# Patient Record
Sex: MALE | Race: BLACK OR AFRICAN AMERICAN | NOT HISPANIC OR LATINO | Employment: FULL TIME | URBAN - METROPOLITAN AREA
[De-identification: names, ages, dates, MRNs, and addresses within clinical notes are randomized per-mention and may not be internally consistent; named-entity substitution may affect disease eponyms.]

---

## 2018-08-27 ENCOUNTER — HOSPITAL ENCOUNTER (EMERGENCY)
Facility: HOSPITAL | Age: 43
Discharge: HOME | End: 2018-08-27
Attending: EMERGENCY MEDICINE
Payer: MEDICAID

## 2018-08-27 VITALS
HEIGHT: 73 IN | OXYGEN SATURATION: 98 % | SYSTOLIC BLOOD PRESSURE: 133 MMHG | TEMPERATURE: 99 F | BODY MASS INDEX: 25.58 KG/M2 | RESPIRATION RATE: 18 BRPM | WEIGHT: 193 LBS | HEART RATE: 78 BPM | DIASTOLIC BLOOD PRESSURE: 72 MMHG

## 2018-08-27 DIAGNOSIS — L30.1 DYSHIDROTIC ECZEMA: Primary | ICD-10-CM

## 2018-08-27 PROCEDURE — 25000003 PHARM REV CODE 250: Performed by: NURSE PRACTITIONER

## 2018-08-27 PROCEDURE — 99283 EMERGENCY DEPT VISIT LOW MDM: CPT

## 2018-08-27 RX ORDER — TRIAMCINOLONE ACETONIDE 1 MG/G
CREAM TOPICAL
Status: COMPLETED | OUTPATIENT
Start: 2018-08-27 | End: 2018-08-27

## 2018-08-27 RX ORDER — TRIAMCINOLONE ACETONIDE 1 MG/G
CREAM TOPICAL 2 TIMES DAILY
Qty: 45 G | Refills: 0 | Status: SHIPPED | OUTPATIENT
Start: 2018-08-27 | End: 2019-04-18 | Stop reason: SDUPTHER

## 2018-08-27 RX ADMIN — TRIAMCINOLONE ACETONIDE: 1 CREAM TOPICAL at 12:08

## 2018-08-27 NOTE — ED TRIAGE NOTES
43 y.o male presents to the ED w/ family. He reports a black spot to his left middle finger x1 month. He reports that he tried anti-fungal cream from [employer] w/ no relief. He also is concerned about a white bump on the tip of his tounge. He is aaox4, ND noted.

## 2018-08-27 NOTE — DISCHARGE INSTRUCTIONS
Apply Eucerin lotion to the finger at least once daily.  You can purchase over-the-counter at any drug store are pharmacy.    Please watch for signs of infection including: increased\spreading redness, swelling, pus-like discharge, or a fever greater than 100.4F. If you experience any of these, please contact your Primary Care Doctor or Return to the Emergency Department for a wound check.     Please follow up with your Primary Care Doctor for skin recheck. Please return to the ER for any new or worsening symptoms.

## 2019-03-25 ENCOUNTER — HOSPITAL ENCOUNTER (EMERGENCY)
Facility: HOSPITAL | Age: 44
Discharge: HOME | End: 2019-03-25
Attending: EMERGENCY MEDICINE
Payer: MEDICAID

## 2019-03-25 VITALS
TEMPERATURE: 98 F | HEIGHT: 73 IN | OXYGEN SATURATION: 97 % | SYSTOLIC BLOOD PRESSURE: 127 MMHG | RESPIRATION RATE: 20 BRPM | HEART RATE: 70 BPM | DIASTOLIC BLOOD PRESSURE: 81 MMHG | BODY MASS INDEX: 25.18 KG/M2 | WEIGHT: 190 LBS

## 2019-03-25 DIAGNOSIS — H10.9 CONJUNCTIVITIS OF BOTH EYES, UNSPECIFIED CONJUNCTIVITIS TYPE: Primary | ICD-10-CM

## 2019-03-25 PROCEDURE — 99284 EMERGENCY DEPT VISIT MOD MDM: CPT

## 2019-03-25 RX ORDER — ERYTHROMYCIN 5 MG/G
OINTMENT OPHTHALMIC
Qty: 1 TUBE | Refills: 0 | Status: SHIPPED | OUTPATIENT
Start: 2019-03-25 | End: 2023-01-13

## 2019-03-25 RX ORDER — CETIRIZINE HYDROCHLORIDE 10 MG/1
10 TABLET ORAL DAILY
Qty: 30 TABLET | Refills: 0 | Status: SHIPPED | OUTPATIENT
Start: 2019-03-25 | End: 2021-02-10 | Stop reason: SDUPTHER

## 2019-03-25 NOTE — ED TRIAGE NOTES
Pt reports to ED via personal transportation alone with c/o crusty eye drainage when waking up in the morning & eye itching/irritation throughout the day ongoing for 1 week along with dry, itchy rash to finger of left hand and left anticubital area ongoing for 1 year; pt reports being seen for the finger rash in the past, was prescribed cream, and used all the cream with no relief; pt AAOx4;

## 2019-03-25 NOTE — DISCHARGE INSTRUCTIONS
Take medications as prescribed.  Follow-up with your regular doctor or the one listed on your discharge paperwork for further treatment.  Return to the emergency department for any new or worsening symptoms or as needed for any additional concerns.    Thank you for coming to our Emergency Department today. It is important to remember that some problems are difficult to diagnose and may not be found during your first visit. Be sure to follow up with your primary care doctor.  If you do not have one, you may contact the one listed on your discharge paperwork or you may also call the [facility] Appointment Desk at [phone] to schedule an appointment with one.     Return to the ER with any questions/concerns, new/concerning symptoms, worsening or failure to improve. Do not drive or make any important decisions for 24 hours if you have received any pain medications, sedatives or mood altering drugs during your ER visit.

## 2019-03-25 NOTE — ED PROVIDER NOTES
Encounter Date: 3/25/2019       History     Chief Complaint   Patient presents with    Eye Drainage     started over a week ago, pt reports when he wakes up he has hardened eye drainage around his eyelids     43-year-old male presenting for evaluation of bilateral eye redness and drainage that began 1-1/2 to 2 weeks ago.  Reports associated itchiness and scratchiness that feels like sandpaper in both eyes.  Symptoms began both eyes at the same time.  He reports awaking in the mornings with his eyes crusted shut.  Denies trauma, fevers, pain with extraocular motions, blurred vision, changes in vision, rhinorrhea, sinus congestion, headache, photophobia, or any additional symptoms. Patient does not wear contacts or glasses.  He has not attempted any treatments for symptoms.        Review of patient's allergies indicates:  No Known Allergies  History reviewed. No pertinent past medical history.  History reviewed. No pertinent surgical history.  History reviewed. No pertinent family history.  Social History     Tobacco Use    Smoking status: Current Some Day Smoker     Packs/day: 0.50     Types: Cigars    Smokeless tobacco: Never Used    Tobacco comment: black & mild   Substance Use Topics    Alcohol use: Yes     Comment: weekends    Drug use: No     Review of Systems   Constitutional: Negative for chills and fever.   HENT: Negative for congestion, postnasal drip, rhinorrhea, sinus pressure and sore throat.    Eyes: Positive for discharge, redness and itching. Negative for pain.   Respiratory: Negative for apnea, cough, choking, chest tightness, shortness of breath, wheezing and stridor.    Cardiovascular: Negative for chest pain, palpitations and leg swelling.   Gastrointestinal: Negative for abdominal pain, diarrhea, nausea and vomiting.   Genitourinary: Negative for dysuria, flank pain, penile pain and urgency.   Musculoskeletal: Negative for arthralgias, back pain, gait problem, joint swelling, myalgias, neck  pain and neck stiffness.   Skin: Negative for color change, pallor, rash and wound.   Neurological: Negative for dizziness, tremors, seizures, syncope and light-headedness.   Psychiatric/Behavioral: Negative for confusion. The patient is not nervous/anxious.        Physical Exam     Initial Vitals [03/25/19 0629]   BP Pulse Resp Temp SpO2   129/82 73 17 99 °F (37.2 °C) 97 %      MAP       --         Physical Exam    Vitals reviewed.  Constitutional: Vital signs are normal. He appears well-developed and well-nourished. He is not diaphoretic. He is active and cooperative.  Non-toxic appearance. He does not have a sickly appearance. He does not appear ill. No distress.   Afebrile, well appearing, in no distress   HENT:   Head: Normocephalic and atraumatic.   Right Ear: Hearing, tympanic membrane, external ear and ear canal normal.   Left Ear: Hearing, tympanic membrane, external ear and ear canal normal.   Nose: Nose normal.   Eyes: EOM and lids are normal. Pupils are equal, round, and reactive to light. Right eye exhibits no chemosis and no discharge. Left eye exhibits no chemosis and no discharge. Right conjunctiva is injected. Left conjunctiva is injected.   Slit lamp exam:       The right eye shows no corneal ulcer, no hyphema and no hypopyon.        The left eye shows no corneal ulcer, no hyphema and no hypopyon.   Bilateral conjunctival injection.  No drainage or exudate noted. No corneal ulcer.  No abrasion, hyphema, hypopyon, or chemosis.  No pain with extraocular motions.  No periorbital erythema or edema. PERRLA bilaterally.   Neck: Normal range of motion and phonation normal. Neck supple. No tracheal deviation present.   Cardiovascular: Normal rate.   Pulmonary/Chest: No stridor. No respiratory distress.   Abdominal: Soft. He exhibits no distension. There is no tenderness.   Musculoskeletal: Normal range of motion. He exhibits no tenderness.   Neurological: He is alert and oriented to person, place, and  time. He has normal strength. No cranial nerve deficit.   Skin: Skin is warm and dry.   Psychiatric: He has a normal mood and affect. His behavior is normal. Judgment and thought content normal.         ED Course   Procedures  Labs Reviewed - No data to display       Imaging Results    None          Medical Decision Making:   History:   Old Medical Records: I decided to obtain old medical records.  Differential Diagnosis:   Conjunctivitis, periorbital cellulitis, orbital cellulitis, corneal ulceration, iritis,  ED Management:  36 y.o. male with redness, subjective discharge, and crusting in both eyes that began 1 and half to 2 weeks ago. Exudative crusting present upon waking this morning.  No other symptoms. Denies pain but reports sandpaper-like scratchy feeling.  No significant prior ophthalmological history. No blurred vision, change in visual acuity, no photophobia, no severe eye pain. No pain with extraocular motion. Patient does not wear contacts.  Patient is afebrile, pleasant, appears well, vitals signs are normal. Bilateral eyes with findings of typical conjunctivitis noted including conjunctival erythema. PERRLA bilaterally, no foreign body noted. No corneal ulceration. The corneas are clear and fundi normal. Visual acuity normal. No periorbital erythema or edema. No findings concering for preseptal cellulitis. Findings clinically consistent with acute infective conjunctivitis. Hygiene discussed.  Prescribed erythromycin ointment and Zyrtec.    Advised patient to follow up with his PCP for re-evaluation and further management.  ED return precautions given. All questions regarding diagnosis and plan were answered to the patient's fullest possible satisfaction. Patient expressed understanding of diagnosis, discharge instructions, and return precautions.                          Clinical Impression:       ICD-10-CM ICD-9-CM   1. Conjunctivitis of both eyes, unspecified conjunctivitis type H10.9 372.30          Disposition:   Disposition: Discharged  Condition: Stable                        Carlitos Donnelly NP  03/25/19 0883

## 2019-04-18 ENCOUNTER — HOSPITAL ENCOUNTER (EMERGENCY)
Facility: HOSPITAL | Age: 44
Discharge: HOME | End: 2019-04-18
Attending: EMERGENCY MEDICINE
Payer: MEDICAID

## 2019-04-18 VITALS
DIASTOLIC BLOOD PRESSURE: 70 MMHG | TEMPERATURE: 99 F | RESPIRATION RATE: 18 BRPM | WEIGHT: 187 LBS | OXYGEN SATURATION: 98 % | SYSTOLIC BLOOD PRESSURE: 130 MMHG | HEART RATE: 82 BPM | HEIGHT: 73 IN | BODY MASS INDEX: 24.78 KG/M2

## 2019-04-18 DIAGNOSIS — R21 SKIN RASH: Primary | ICD-10-CM

## 2019-04-18 PROCEDURE — 99283 EMERGENCY DEPT VISIT LOW MDM: CPT

## 2019-04-18 RX ORDER — TRIAMCINOLONE ACETONIDE 1 MG/G
CREAM TOPICAL 2 TIMES DAILY
Qty: 15 G | Refills: 0 | Status: SHIPPED | OUTPATIENT
Start: 2019-04-18 | End: 2019-05-02

## 2019-04-18 NOTE — ED TRIAGE NOTES
Patient reports rash and blister to left middle finger.  Patient denies pain. Patient reports that symptoms have been intermittent for a year.

## 2019-04-18 NOTE — DISCHARGE INSTRUCTIONS
Please return to the Emergency Department for any new or worsening symptoms including: worsening or changes in the finger, fever, chest pain, shortness of breath, loss of consciousness, dizziness, weakness, or any other concerns.     Please follow up with your Primary Care Provider or Demrmatology within in the week. If you do not have one, you may contact the one listed on your discharge paperwork or you may also call the [facility] Appointment Desk at [phone] to schedule an appointment with one.      Please take all medication as prescribed.     Use moisturizer between doses of the steroid cream.

## 2019-04-18 NOTE — ED PROVIDER NOTES
"Encounter Date: 4/18/2019    SCRIBE #1 NOTE: I, Jh Kunz , am scribing for, and in the presence of,  HOANG Miramontes. I have scribed the following portions of the note - Other sections scribed: HPI, ROS.       History     Chief Complaint   Patient presents with    Blister     Blisters to left middle finger.  "It bleeds sometimes and has pus."     CC: Blister    HPI: 45 y/o M who has no past medical history on file presents to the ED with gradual onset of a rash/blister to the L middle finger. Pt was seen at the ED 8/27/18 for same symptoms and was prescribed Kenalog cream which provides temporary relief. Pt states upon applying the cream his blister will dry up but will come back within a couple of days. He reports intermittent episodes of bleeding and pus drainage. Pt denies making any follow up appointments with Dermatology. Pt works as a . Pt has no other complaints at this time. He is also requesting a work note.       The history is provided by the patient. No  was used.     Review of patient's allergies indicates:  No Known Allergies  History reviewed. No pertinent past medical history.  History reviewed. No pertinent surgical history.  History reviewed. No pertinent family history.  Social History     Tobacco Use    Smoking status: Current Some Day Smoker     Packs/day: 0.50     Types: Cigars    Smokeless tobacco: Never Used    Tobacco comment: black & mild   Substance Use Topics    Alcohol use: Yes     Comment: weekends    Drug use: No     Review of Systems   Constitutional: Negative for appetite change and fever.   HENT: Negative for rhinorrhea and sore throat.    Eyes: Negative for visual disturbance.   Respiratory: Negative for cough and shortness of breath.    Cardiovascular: Negative for chest pain.   Gastrointestinal: Negative for abdominal pain.   Genitourinary: Negative for dysuria.   Musculoskeletal: Negative for arthralgias and gait problem.   Skin: Positive for " rash (Left Middle Finger ).   Neurological: Negative for syncope.       Physical Exam     Initial Vitals [04/18/19 0739]   BP Pulse Resp Temp SpO2   130/70 82 18 99.3 °F (37.4 °C) 98 %      MAP       --         Physical Exam    Nursing note and vitals reviewed.  Constitutional: He appears well-developed and well-nourished. He is not diaphoretic. He is cooperative.  Non-toxic appearance. No distress.   HENT:   Head: Normocephalic and atraumatic.   Right Ear: External ear normal.   Left Ear: External ear normal.   Mouth/Throat: Oropharynx is clear and moist.   Eyes: Conjunctivae and EOM are normal.   Neck: Normal range of motion.   Cardiovascular: Normal rate and regular rhythm.   Pulmonary/Chest: No respiratory distress.   Neurological: He is alert and oriented to person, place, and time. GCS eye subscore is 4. GCS verbal subscore is 5. GCS motor subscore is 6.   Skin: Skin is warm and dry. Capillary refill takes less than 2 seconds.   Area of dry skin on the left middle finger without blistering, erythema, drainage, or bleeding. No increased warmth or visible open wounds. No TTP over the digit. Normal flexion and extension of all joints. Callus present at the MCP joint on the palmar hand of 3rd and 4th fingers of left hand and right hand. No other wounds on palm. See image below:    Psychiatric: He has a normal mood and affect. His behavior is normal. Judgment and thought content normal.             ED Course   Procedures  Labs Reviewed - No data to display       Imaging Results    None                APC / Resident Notes:   This is an evaluation of a 44 y.o. male that presents to the Emergency Department for rash/blistering to the left middle finger.  Patient reports ongoing for several months.  Reports got a steroid cream in the ED which help symptoms but it returned after he finishes the cream.  He is currently out of the cream Physical Exam shows a non-toxic, afebrile, and well appearing male.  There is an area  of dry skin on the left middle finger from the PIP distally on the dorsal surface of the finger.  There is no erythema, increased warmth, drainage, or signs of infection.  Normal flexion extension of all joints.  He has callused lesions on the palmar hand near the MCP.  No other palmar lesions noted. No other rashes or similar lesions to the finger identified. Vital signs are reassuring. If available, previous records reviewed.     My overall impression is skin rash-?  Atopic dermatitis versus other nonspecific dermatitis. I considered, but at this time, do not suspect cellulitis, abscess, flexor tenosynovitis.  Does not appear consistent with herpetic prasanna.    D/C Meds:  Kenalog. D/C Information:  Moisturizer between Kenalog use. The diagnosis, treatment plan, instructions for follow-up and reevaluation with Dermatology as well as ED return precautions were discussed and understanding was verbalized. All questions or concerns have been addressed.  Patient requested a work note, 1 was given return to work today.  KRIS Argueta, HOANG-C       Scribe Attestation:   Scribe #1: I performed the above scribed service and the documentation accurately describes the services I performed. I attest to the accuracy of the note.    Attending Attestation:           Physician Attestation for Scribe:  Physician Attestation Statement for Scribe #1: I, HOANG Miramontes, reviewed documentation, as scribed by Jh Kunz  in my presence, and it is both accurate and complete.                    Clinical Impression:       ICD-10-CM ICD-9-CM   1. Skin rash R21 782.1         Disposition:   Disposition: Discharged  Condition: Stable                        HOANG Green  04/18/19 0814

## 2019-11-01 ENCOUNTER — HOSPITAL ENCOUNTER (EMERGENCY)
Facility: HOSPITAL | Age: 44
Discharge: HOME | End: 2019-11-01
Attending: EMERGENCY MEDICINE
Payer: COMMERCIAL

## 2019-11-01 VITALS
HEART RATE: 71 BPM | SYSTOLIC BLOOD PRESSURE: 132 MMHG | OXYGEN SATURATION: 98 % | DIASTOLIC BLOOD PRESSURE: 84 MMHG | HEIGHT: 73 IN | WEIGHT: 185 LBS | RESPIRATION RATE: 16 BRPM | TEMPERATURE: 98 F | BODY MASS INDEX: 24.52 KG/M2

## 2019-11-01 DIAGNOSIS — R10.30 LOWER ABDOMINAL PAIN: Primary | ICD-10-CM

## 2019-11-01 DIAGNOSIS — R31.9 HEMATURIA, UNSPECIFIED TYPE: ICD-10-CM

## 2019-11-01 LAB
BILIRUBIN, POC UA: NEGATIVE
BLOOD, POC UA: ABNORMAL
CLARITY, POC UA: CLEAR
COLOR, POC UA: YELLOW
GLUCOSE, POC UA: NEGATIVE
KETONES, POC UA: NEGATIVE
LEUKOCYTE EST, POC UA: NEGATIVE
NITRITE, POC UA: NEGATIVE
PH UR STRIP: 5.5 [PH]
PROTEIN, POC UA: NEGATIVE
SPECIFIC GRAVITY, POC UA: >=1.03
UROBILINOGEN, POC UA: 0.2 E.U./DL

## 2019-11-01 PROCEDURE — 87491 CHLMYD TRACH DNA AMP PROBE: CPT

## 2019-11-01 PROCEDURE — 99284 EMERGENCY DEPT VISIT MOD MDM: CPT | Mod: 25,ER

## 2019-11-01 PROCEDURE — 81003 URINALYSIS AUTO W/O SCOPE: CPT | Mod: ER

## 2019-11-01 NOTE — DISCHARGE INSTRUCTIONS
Drink plenty of fluids. Follow up with a primary care physician or urologist for further discussion of the hematuria. Return for any new or acute problems or concerns.

## 2019-11-01 NOTE — ED PROVIDER NOTES
Encounter Date: 11/1/2019    SCRIBE #1 NOTE: I, Ana Rock , am scribing for, and in the presence of,  Dr. Chirinos . I have scribed the following portions of the note - Other sections scribed: HPI, ROS, PE.       History     Chief Complaint   Patient presents with    Abdominal Pain     Pt reports lower abdominal pain almost to groin area L side, pain comes and goes, worse with palpation, reports hematuria 3 weeks ago     Mr. Nicolas presents to the ED with intermittent left lower abdominal pain that radiates into his left lower back for 2 weeks. He describes the pain as burning. He states seeing blood in his urine about 1 to 2 weeks ago. He denies penile discharge, dysuria, fever, chills, chest pain, SOB, or body aches. No known medical problems. No allergies. Denies much pain at all currently, did have some mild pain in LLQ yesterday and this am. Doesn't affect. Adls. No weight loss, no penile discharge.     The history is provided by the patient.     Review of patient's allergies indicates:  No Known Allergies  No past medical history on file.  No past surgical history on file.  No family history on file.  Social History     Tobacco Use    Smoking status: Current Some Day Smoker     Packs/day: 0.50     Types: Cigars    Smokeless tobacco: Never Used    Tobacco comment: black & mild   Substance Use Topics    Alcohol use: Yes     Comment: weekends    Drug use: No     Review of Systems   Constitutional: Negative for chills and fever.   Respiratory: Negative for shortness of breath.    Cardiovascular: Negative for chest pain.   Gastrointestinal: Positive for abdominal pain (left lower).   Genitourinary: Positive for hematuria. Negative for discharge and dysuria.   Musculoskeletal: Positive for back pain (left lower ). Negative for myalgias.   All other systems reviewed and are negative.      Physical Exam     Initial Vitals [11/01/19 1128]   BP Pulse Resp Temp SpO2   (!) 146/89 87 18 98.3 °F (36.8 °C) 98 %       MAP       --         Physical Exam    Nursing note and vitals reviewed.  Constitutional: He appears well-developed and well-nourished. He is not diaphoretic. No distress.   HENT:   Head: Normocephalic and atraumatic.   Eyes: EOM are normal.   Neck: Normal range of motion.   Cardiovascular: Normal rate, regular rhythm and normal heart sounds. Exam reveals no gallop and no friction rub.    No murmur heard.  Pulmonary/Chest: Breath sounds normal. No respiratory distress. He has no wheezes. He has no rhonchi. He has no rales.   Abdominal: Soft. He exhibits no distension. There is no tenderness. There is no rigidity, no rebound, no guarding and no CVA tenderness.   Musculoskeletal: Normal range of motion.   Neurological: He is alert and oriented to person, place, and time. No cranial nerve deficit or sensory deficit.   Skin: Skin is warm and dry. Capillary refill takes less than 2 seconds.   Psychiatric: He has a normal mood and affect. His behavior is normal.         ED Course   Procedures  Labs Reviewed   POCT URINALYSIS W/O SCOPE - Abnormal; Notable for the following components:       Result Value    Spec Grav UA >=1.030 (*)     Blood, UA Trace-lysed (*)     All other components within normal limits   C. TRACHOMATIS/N. GONORRHOEAE BY AMP DNA   POCT URINALYSIS W/O SCOPE          Imaging Results          CT Renal Stone Study Abd Pelvis WO (Final result)  Result time 11/01/19 12:26:46   Procedure changed from CT Abdomen Pelvis  Without Contrast     Final result by Ronnell Vasques MD (11/01/19 12:26:46)                 Impression:      1. No findings to suggest obstructive uropathy or acute etiology for provided history of hematuria.  2. There are multiple scattered colonic diverticula without convincing discrete surrounding inflammation to suggest diverticulitis allowing for motion artifact.  3. Additional findings above.      Electronically signed by: Ronnell Vasques MD  Date:    11/01/2019  Time:    12:26              Narrative:    EXAMINATION:  CT RENAL STONE STUDY ABD PELVIS WO    CLINICAL HISTORY:  Flank pain, stone disease suspected;LLQ pain, hematuria;    TECHNIQUE:  Low dose axial images, sagittal and coronal reformations were obtained from the lung bases to the pubic symphysis.  Contrast was not administered.    COMPARISON:  None    FINDINGS:  Images of the lower thorax are unremarkable.    There is a subcentimeter hypoattenuating lesion within the right hepatic dome, too small for characterization.  The spleen, pancreas, gallbladder and adrenal glands have a grossly unremarkable noncontrast appearance.  There is no biliary dilation or ascites.  The pancreatic duct is not dilated.  No significant abdominal lymphadenopathy.    The kidneys have a grossly unremarkable noncontrast appearance without hydronephrosis or nephrolithiasis.  The bilateral ureters are unable to be followed in their entirety to the urinary bladder, no definite calculi seen and no secondary findings to suggest obstructive uropathy.  The urinary bladder is decompressed.  The prostate is not enlarged.    Multiple scattered colonic diverticula are noted, no surrounding inflammation to suggest diverticulitis.  The terminal ileum and appendix are unremarkable.  The small bowel is grossly unremarkable.  There is atherosclerotic calcification of the aorta.  No focal organized pelvic fluid collection.    No focal osseous destructive process.  Degenerative changes are noted at L4-L5.  No significant inguinal lymphadenopathy.                                 Medical Decision Making:   Clinical Tests:   Lab Tests: Ordered and Reviewed  Radiological Study: Ordered and Reviewed  ED Management:  Hematuria noted. Non toxic and well appearing. No pain currently. gu cxs sent. Discussed ct results w pt. He will f/u a pcp and /or urology. Questions answered. Pt is stable for d/c. We discussed home care and worrisome signs that should prompt need to return to er should  they occur. There is no indication for further emergent intervention or evaluation at this time.               Scribe Attestation:   Scribe #1: I performed the above scribed service and the documentation accurately describes the services I performed. I attest to the accuracy of the note.               Clinical Impression:     1. Lower abdominal pain    2. Hematuria, unspecified type                                   Haroon Chirinos MD  11/02/19 1004

## 2019-11-03 LAB
C TRACH DNA SPEC QL NAA+PROBE: NOT DETECTED
N GONORRHOEA DNA SPEC QL NAA+PROBE: NOT DETECTED

## 2020-02-24 ENCOUNTER — HOSPITAL ENCOUNTER (EMERGENCY)
Facility: HOSPITAL | Age: 45
Discharge: HOME | End: 2020-02-24
Attending: EMERGENCY MEDICINE
Payer: COMMERCIAL

## 2020-02-24 VITALS
WEIGHT: 189 LBS | RESPIRATION RATE: 18 BRPM | HEIGHT: 73 IN | BODY MASS INDEX: 25.05 KG/M2 | SYSTOLIC BLOOD PRESSURE: 128 MMHG | DIASTOLIC BLOOD PRESSURE: 97 MMHG | TEMPERATURE: 98 F | OXYGEN SATURATION: 98 % | HEART RATE: 74 BPM

## 2020-02-24 DIAGNOSIS — M54.50 ACUTE BILATERAL LOW BACK PAIN WITHOUT SCIATICA: Primary | ICD-10-CM

## 2020-02-24 PROCEDURE — 99284 EMERGENCY DEPT VISIT MOD MDM: CPT | Mod: 25,ER

## 2020-02-24 PROCEDURE — 63600175 PHARM REV CODE 636 W HCPCS: Mod: ER | Performed by: EMERGENCY MEDICINE

## 2020-02-24 PROCEDURE — 96372 THER/PROPH/DIAG INJ SC/IM: CPT | Mod: 59,ER

## 2020-02-24 RX ORDER — DEXAMETHASONE SODIUM PHOSPHATE 4 MG/ML
8 INJECTION, SOLUTION INTRA-ARTICULAR; INTRALESIONAL; INTRAMUSCULAR; INTRAVENOUS; SOFT TISSUE
Status: COMPLETED | OUTPATIENT
Start: 2020-02-24 | End: 2020-02-24

## 2020-02-24 RX ORDER — IBUPROFEN 800 MG/1
800 TABLET ORAL EVERY 6 HOURS PRN
Qty: 20 TABLET | Refills: 0 | Status: SHIPPED | OUTPATIENT
Start: 2020-02-24 | End: 2020-02-28 | Stop reason: SDUPTHER

## 2020-02-24 RX ORDER — KETOROLAC TROMETHAMINE 30 MG/ML
60 INJECTION, SOLUTION INTRAMUSCULAR; INTRAVENOUS
Status: COMPLETED | OUTPATIENT
Start: 2020-02-24 | End: 2020-02-24

## 2020-02-24 RX ORDER — METHOCARBAMOL 750 MG/1
1500 TABLET, FILM COATED ORAL EVERY 6 HOURS
Qty: 24 TABLET | Refills: 0 | Status: SHIPPED | OUTPATIENT
Start: 2020-02-24 | End: 2020-02-28 | Stop reason: SDUPTHER

## 2020-02-24 RX ADMIN — DEXAMETHASONE SODIUM PHOSPHATE 8 MG: 4 INJECTION, SOLUTION INTRA-ARTICULAR; INTRALESIONAL; INTRAMUSCULAR; INTRAVENOUS; SOFT TISSUE at 06:02

## 2020-02-24 RX ADMIN — KETOROLAC TROMETHAMINE 60 MG: 30 INJECTION, SOLUTION INTRAMUSCULAR at 06:02

## 2020-02-24 NOTE — ED TRIAGE NOTES
Pt presents to ER with c/o lower back pain for about 2 weeks..  He states at that time he picked up on something heavy and might have strained it.  Pain radiates to the front, states it feels like a straining type of pain.

## 2020-02-24 NOTE — ED PROVIDER NOTES
Encounter Date: 2/24/2020       History     Chief Complaint   Patient presents with    Back Pain     Low back pain x 1 week unrelieved by otc meds; denies injury     44 y.o. Male with no known medical problems presents emergency department complaining of acute, nontraumatic, bilateral lower back pain that began a week ago.  Patient states he was working on a car when he noticed the symptoms at that time and states pain became worse on Saturday when he was lifting a heavy boiling pot.  He denies dysuria, hematuria, frequency, saddle anesthesia, bowel/bladder incontinence or urinary retention.    The history is provided by the patient.     Review of patient's allergies indicates:  No Known Allergies  History reviewed. No pertinent past medical history.  History reviewed. No pertinent surgical history.  History reviewed. No pertinent family history.  Social History     Tobacco Use    Smoking status: Current Some Day Smoker     Packs/day: 0.50     Types: Cigars    Smokeless tobacco: Never Used    Tobacco comment: black & mild   Substance Use Topics    Alcohol use: Yes     Comment: weekends    Drug use: No     Review of Systems   Constitutional: Negative for fever and unexpected weight change.   Genitourinary: Negative for dysuria, frequency and hematuria.   Musculoskeletal: Positive for back pain. Negative for gait problem.   All other systems reviewed and are negative.      Physical Exam     Initial Vitals [02/24/20 0522]   BP Pulse Resp Temp SpO2   (!) 138/103 77 20 97.9 °F (36.6 °C) 97 %      MAP       --         Physical Exam    Nursing note and vitals reviewed.  Constitutional: He appears well-developed and well-nourished. He is not diaphoretic. No distress.   HENT:   Head: Normocephalic and atraumatic.   Mouth/Throat: Oropharynx is clear and moist.   Eyes: Conjunctivae and EOM are normal.   Neck: Normal range of motion and phonation normal. Neck supple. No stridor present.   Cardiovascular: Regular rhythm  and intact distal pulses.   Pulmonary/Chest: No accessory muscle usage. No tachypnea. No respiratory distress.   Abdominal: Soft. He exhibits no distension. There is no tenderness. There is no rebound and no guarding.   Musculoskeletal: Normal range of motion.        Lumbar back: He exhibits tenderness and spasm. He exhibits no swelling.        Back:    Neurological: He is alert and oriented to person, place, and time.   Skin: Skin is warm and intact. No rash noted.   Psychiatric: He has a normal mood and affect.         ED Course   Procedures  Labs Reviewed - No data to display       Imaging Results    None                       Labs Reviewed       Imaging Reviewed    Imaging Results    None         Medications given in ED    Medications   dexamethasone injection 8 mg (8 mg Intramuscular Given 2/24/20 0606)   ketorolac injection 60 mg (60 mg Intramuscular Given 2/24/20 0605)       Note was created using voice recognition software. Note may have occasional typographical errors that may not have been identified and edited despite good sandra initial review prior to signing.                   Discharge Medications     Medication List with Changes/Refills   New Medications    IBUPROFEN (ADVIL,MOTRIN) 800 MG TABLET    Take 1 tablet (800 mg total) by mouth every 6 (six) hours as needed for Pain.    METHOCARBAMOL (ROBAXIN) 750 MG TAB    Take 2 tablets (1,500 mg total) by mouth every 6 (six) hours. for 3 days   Current Medications    CETIRIZINE (ZYRTEC) 10 MG TABLET    Take 1 tablet (10 mg total) by mouth once daily.    ERYTHROMYCIN (ROMYCIN) OPHTHALMIC OINTMENT    Place a 1/2 inch ribbon of ointment into the lower eyelids once every 6 hours.    TRIAMCINOLONE ACETONIDE 0.1% (KENALOG) 0.1 % CREAM    Apply topically 2 (two) times daily. Apply to finger rash. Do not apply to the face. for 14 days             Patient discharged to home in stable condition with instructions to:   1. Please take all meds as prescribed.  2.  Follow-up with your primary care doctor   3. Return precautions discussed and patient and/or family/caretaker understands to return to the emergency room for any concerns including worsening of your current symptoms, fever, chills, night sweats, worsening pain, chest pain, shortness of breath, nausea, vomiting, diarrhea, bleeding, headache, difficulty talking, visual disturbances, weakness, numbness or any other acute concerns          Clinical Impression:       ICD-10-CM ICD-9-CM   1. Acute bilateral low back pain without sciatica M54.5 724.2     338.19         Disposition:   Disposition: Discharged  Condition: Stable                     Ronni Dickson MD  02/24/20 0659

## 2021-02-10 ENCOUNTER — HOSPITAL ENCOUNTER (EMERGENCY)
Facility: HOSPITAL | Age: 46
Discharge: HOME | End: 2021-02-10
Attending: EMERGENCY MEDICINE
Payer: COMMERCIAL

## 2021-02-10 VITALS
DIASTOLIC BLOOD PRESSURE: 80 MMHG | SYSTOLIC BLOOD PRESSURE: 132 MMHG | HEART RATE: 80 BPM | WEIGHT: 189 LBS | BODY MASS INDEX: 25.05 KG/M2 | TEMPERATURE: 99 F | HEIGHT: 73 IN | OXYGEN SATURATION: 99 % | RESPIRATION RATE: 20 BRPM

## 2021-02-10 DIAGNOSIS — R09.81 NASAL CONGESTION: ICD-10-CM

## 2021-02-10 DIAGNOSIS — R05.9 COUGH: ICD-10-CM

## 2021-02-10 DIAGNOSIS — J02.9 PHARYNGITIS, UNSPECIFIED ETIOLOGY: Primary | ICD-10-CM

## 2021-02-10 LAB
CTP QC/QA: YES
POC RAPID STREP A: NEGATIVE
SARS-COV-2 RDRP RESP QL NAA+PROBE: NEGATIVE

## 2021-02-10 PROCEDURE — 99283 EMERGENCY DEPT VISIT LOW MDM: CPT | Mod: 25,ER

## 2021-02-10 PROCEDURE — U0002 COVID-19 LAB TEST NON-CDC: HCPCS | Mod: ER | Performed by: NURSE PRACTITIONER

## 2021-02-10 RX ORDER — CETIRIZINE HYDROCHLORIDE 10 MG/1
10 TABLET ORAL DAILY
Qty: 30 TABLET | Refills: 0 | Status: SHIPPED | OUTPATIENT
Start: 2021-02-10 | End: 2021-03-12

## 2021-02-10 RX ORDER — FLUTICASONE PROPIONATE 50 MCG
2 SPRAY, SUSPENSION (ML) NASAL DAILY
Qty: 15.8 G | Refills: 0 | Status: SHIPPED | OUTPATIENT
Start: 2021-02-10 | End: 2023-01-13

## 2021-02-10 RX ORDER — OMEPRAZOLE 40 MG/1
40 CAPSULE, DELAYED RELEASE ORAL EVERY MORNING
Qty: 30 CAPSULE | Refills: 0 | Status: SHIPPED | OUTPATIENT
Start: 2021-02-10 | End: 2023-05-16 | Stop reason: SDUPTHER

## 2022-06-26 ENCOUNTER — HOSPITAL ENCOUNTER (EMERGENCY)
Facility: HOSPITAL | Age: 47
Discharge: HOME | End: 2022-06-26
Attending: EMERGENCY MEDICINE
Payer: COMMERCIAL

## 2022-06-26 VITALS
DIASTOLIC BLOOD PRESSURE: 82 MMHG | WEIGHT: 181 LBS | TEMPERATURE: 98 F | RESPIRATION RATE: 18 BRPM | BODY MASS INDEX: 23.99 KG/M2 | HEART RATE: 70 BPM | OXYGEN SATURATION: 99 % | HEIGHT: 73 IN | SYSTOLIC BLOOD PRESSURE: 139 MMHG

## 2022-06-26 DIAGNOSIS — R07.89 CHEST TIGHTNESS: ICD-10-CM

## 2022-06-26 LAB
ALBUMIN SERPL-MCNC: 3.5 G/DL (ref 3.3–5.5)
ALP SERPL-CCNC: 74 U/L (ref 42–141)
BILIRUB SERPL-MCNC: 0.7 MG/DL (ref 0.2–1.6)
BUN SERPL-MCNC: 14 MG/DL (ref 7–22)
CALCIUM SERPL-MCNC: 9 MG/DL (ref 8–10.3)
CHLORIDE SERPL-SCNC: 104 MMOL/L (ref 98–108)
CREAT SERPL-MCNC: 0.7 MG/DL (ref 0.6–1.2)
GLUCOSE SERPL-MCNC: 90 MG/DL (ref 73–118)
POC ALT (SGPT): 21 U/L (ref 10–47)
POC AST (SGOT): 23 U/L (ref 11–38)
POC B-TYPE NATRIURETIC PEPTIDE: <5 PG/ML (ref 0–100)
POC CARDIAC TROPONIN I: 0 NG/ML
POC TCO2: 27 MMOL/L (ref 18–33)
POTASSIUM BLD-SCNC: 4.2 MMOL/L (ref 3.6–5.1)
PROTEIN, POC: 6.5 G/DL (ref 6.4–8.1)
SAMPLE: NORMAL
SODIUM BLD-SCNC: 142 MMOL/L (ref 128–145)

## 2022-06-26 PROCEDURE — 93010 EKG 12-LEAD: ICD-10-PCS | Mod: ,,, | Performed by: INTERNAL MEDICINE

## 2022-06-26 PROCEDURE — 93010 ELECTROCARDIOGRAM REPORT: CPT | Mod: ,,, | Performed by: INTERNAL MEDICINE

## 2022-06-26 PROCEDURE — 80053 COMPREHEN METABOLIC PANEL: CPT | Mod: ER

## 2022-06-26 PROCEDURE — 83880 ASSAY OF NATRIURETIC PEPTIDE: CPT | Mod: ER

## 2022-06-26 PROCEDURE — 99285 EMERGENCY DEPT VISIT HI MDM: CPT | Mod: 25,ER

## 2022-06-26 PROCEDURE — 25000003 PHARM REV CODE 250: Mod: ER | Performed by: EMERGENCY MEDICINE

## 2022-06-26 PROCEDURE — 93005 ELECTROCARDIOGRAM TRACING: CPT | Mod: ER

## 2022-06-26 PROCEDURE — 84484 ASSAY OF TROPONIN QUANT: CPT | Mod: ER

## 2022-06-26 PROCEDURE — 85025 COMPLETE CBC W/AUTO DIFF WBC: CPT | Mod: ER

## 2022-06-26 RX ORDER — ROPINIROLE 1 MG/1
1.5 TABLET, FILM COATED ORAL NIGHTLY
Qty: 45 TABLET | Refills: 0 | Status: SHIPPED | OUTPATIENT
Start: 2022-06-26 | End: 2023-01-13

## 2022-06-26 RX ORDER — METHOCARBAMOL 500 MG/1
1000 TABLET, FILM COATED ORAL 3 TIMES DAILY
Qty: 30 TABLET | Refills: 0 | Status: SHIPPED | OUTPATIENT
Start: 2022-06-26 | End: 2022-07-01

## 2022-06-26 RX ORDER — MAG HYDROX/ALUMINUM HYD/SIMETH 200-200-20
30 SUSPENSION, ORAL (FINAL DOSE FORM) ORAL
Status: COMPLETED | OUTPATIENT
Start: 2022-06-26 | End: 2022-06-26

## 2022-06-26 RX ORDER — LIDOCAINE HYDROCHLORIDE 20 MG/ML
10 SOLUTION OROPHARYNGEAL
Status: COMPLETED | OUTPATIENT
Start: 2022-06-26 | End: 2022-06-26

## 2022-06-26 RX ADMIN — ALUMINUM HYDROXIDE, MAGNESIUM HYDROXIDE, AND SIMETHICONE 30 ML: 200; 200; 20 SUSPENSION ORAL at 11:06

## 2022-06-26 RX ADMIN — LIDOCAINE HYDROCHLORIDE 10 ML: 20 SOLUTION ORAL at 11:06

## 2022-06-26 NOTE — ED PROVIDER NOTES
Encounter Date: 6/26/2022    SCRIBE #1 NOTE: I, Isha Sweeney, am scribing for, and in the presence of,  Donovan Brink MD. I have scribed the following portions of the note - Other sections scribed: HPI; ROS.       History     Chief Complaint   Patient presents with    Chest Pain     States the past 4 days with left arm numbness, chest tight ness, states feeling gassy and burping a lot, both legs feel restless.     Bebeto Nicolas is a 47 y.o. male with no known medical Hx who presents to the ED for chief complaint of numbness in the left arm and gas pain localized in the chest onset 3 days ago. Patient reports the numbness starts at the left shoulder and radiates down to the hand. 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 Patient reports he was sent to the ED where he had an angiogram preformed and was told everything looked good. He states he's had the gas pain in the chest intermittently since the mild MI. Patient notes he has been belching more than usual and drinks Sprite to help the gas come out. He attempted treatment at home today with Pepto bismol.       The history is provided by the patient. No  was used.     Review of patient's allergies indicates:  No Known Allergies  History reviewed. No pertinent past medical history.  History reviewed. No pertinent surgical history.  History reviewed. No pertinent family history.  Social History     Tobacco Use    Smoking status: Current Some Day Smoker     Packs/day: 0.50     Types: Cigars    Smokeless tobacco: Never Used    Tobacco comment: black & mild   Substance Use Topics    Alcohol use: Yes     Comment: weekends    Drug use: No     Review of Systems   Constitutional: Negative.    HENT: Negative.    Eyes: Negative.    Respiratory: Negative.    Cardiovascular: Positive for chest pain.   Gastrointestinal: Negative.     Genitourinary: Negative.    Musculoskeletal: Negative.    Skin: Negative.    Neurological: Positive for numbness.       Physical Exam     Initial Vitals [06/26/22 1039]   BP Pulse Resp Temp SpO2   (!) 152/74 81 18 98.4 °F (36.9 °C) 99 %      MAP       --         Physical Exam    Nursing note and vitals reviewed.  Constitutional: He appears well-developed and well-nourished. He is not diaphoretic. No distress.   HENT:   Head: Normocephalic and atraumatic.   Nose: Nose normal.   Mouth/Throat: No oropharyngeal exudate.   Eyes: EOM are normal. Pupils are equal, round, and reactive to light.   Neck: Neck supple. No tracheal deviation present. No JVD present.   Normal range of motion.  Cardiovascular: Normal rate, regular rhythm, normal heart sounds and intact distal pulses.   Pulmonary/Chest: Breath sounds normal. No respiratory distress. He has no wheezes. He has no rhonchi. He has no rales.   Abdominal: Abdomen is soft. Bowel sounds are normal. He exhibits no distension. There is no abdominal tenderness. There is no rebound and no guarding.   Musculoskeletal:         General: No tenderness or edema. Normal range of motion.      Cervical back: Normal range of motion and neck supple.     Neurological: He is alert and oriented to person, place, and time. He has normal strength. A sensory deficit (Mildly decreased sensation over left upper extremity when compared to right upper extremity limited proximal to elbow, neurovascular intact distally otherwise.) is present.   Skin: Skin is warm and dry. Capillary refill takes less than 2 seconds. No rash noted. No erythema.         ED Course   Procedures  Labs Reviewed   TROPONIN ISTAT   POCT CBC   POCT CMP   POCT B-TYPE NATRIURETIC PEPTIDE (BNP)   POCT TROPONIN   POCT CMP   POCT B-TYPE NATRIURETIC PEPTIDE (BNP)          Imaging Results          X-Ray Chest 1 View (Final result)  Result time 06/26/22 11:13:36    Final result by Edgar Dhillon MD (06/26/22 11:13:36)                  Impression:      As above.      Electronically signed by: Edgar Dhillon MD  Date:    06/26/2022  Time:    11:13             Narrative:    EXAMINATION:  XR CHEST 1 VIEW    CLINICAL HISTORY:  Chest Pain;    TECHNIQUE:  Single frontal view of the chest was performed.    FINDINGS:  The lungs are clear.  There is no pneumothorax or pleural fluid.  The cardiac silhouette is not enlarged.  The osseous structures are unremarkable.                                 Medications   aluminum-magnesium hydroxide-simethicone 200-200-20 mg/5 mL suspension 30 mL (30 mLs Oral Given 6/26/22 1116)   LIDOcaine HCl 2% oral solution 10 mL (10 mLs Oral Given 6/26/22 1115)     Medical Decision Making:   History:   Old Medical Records: I decided to obtain old medical records.  Clinical Tests:   Lab Tests: Ordered and Reviewed  Radiological Study: Reviewed and Ordered  Medical Tests: Ordered and Reviewed    MDM:    47 y.o.male with PMHx as noted above, presents with chest pain, LUE numbness. Physical exam as noted above.  ED workup remarkable for EKG - as noted below, trop - neg, BNP - wnl, CBC/CMP - unremarkable, CXR - unremarkable. Pt presentation consistent with chest pain suspect GERD as the underlying etiology.  Unclear etiology for his left upper extremity weakness without any further motor deficit noted, patient associates this with his restless leg syndrome worse at night.  Will increase his Requip at this point advised on adherence to his medication follow-up with PCP.  At this time given patient's history, physical exam, and ED workup do not suspect arrhythmia, MI/ACS, PE, PTX, aortic dissection, pericarditis, PNA, shingles, or any further malignant cause.  Discussed diagnosis and further treatment with patient including f/u, return precautions given and all questions answered.  Patient in understanding of plan.  Pt discharged to home improved and stable.            Scribe Attestation:   Scribe #1: I performed the above scribed  service and the documentation accurately describes the services I performed. I attest to the accuracy of the note.        ED Course as of 06/26/22 1750   Sun Jun 26, 2022   1105 EKG-normal sinus rhythm rate of 81 beats per minute, nonspecific ST and T-wave changes noted in V2, lead 3,  MS, incomplete right bundle-branch block present, no STEMI. [BB]      ED Course User Index  [BB] Donovan Brink MD             Clinical Impression:   Final diagnoses:  [R07.89] Chest tightness       Scribe attestation: I, Donovan Brink M.D., personally performed the services described in this documentation.  All medical record entries made by the scribe were at my direction and in my presence.  I have reviewed the chart and agree that the record reflects my personal performance and is accurate and complete.     ED Disposition Condition    Discharge Stable        ED Prescriptions     Medication Sig Dispense Start Date End Date Auth. Provider    rOPINIRole (REQUIP) 1 MG tablet Take 1.5 tablets (1.5 mg total) by mouth every evening. 45 tablet 6/26/2022 6/26/2023 Donovan Brink MD    methocarbamoL (ROBAXIN) 500 MG Tab Take 2 tablets (1,000 mg total) by mouth 3 (three) times daily. for 5 days 30 tablet 6/26/2022 7/1/2022 Donovan Brink MD        Follow-up Information     Follow up With Specialties Details Why Contact [facility] ED Emergency Medicine Go to  If symptoms worsen [address] [facility] [address] [phone]    Artur Fofana MD Family Medicine Schedule an appointment as soon as possible for a visit   [address] [place] [address]  [phone]             Donovan Brink MD  06/26/22 2631

## 2022-06-26 NOTE — DISCHARGE INSTRUCTIONS
Increase to 1.5 mg nightly for your Restless leg medication - ropinirole.  Please be consistent with taking it every night, do not discontinue taking it until seeing your Primary Care physician.

## 2022-07-21 ENCOUNTER — HOSPITAL ENCOUNTER (EMERGENCY)
Facility: HOSPITAL | Age: 47
Discharge: HOME | End: 2022-07-21
Attending: EMERGENCY MEDICINE
Payer: COMMERCIAL

## 2022-07-21 VITALS
RESPIRATION RATE: 19 BRPM | DIASTOLIC BLOOD PRESSURE: 76 MMHG | SYSTOLIC BLOOD PRESSURE: 139 MMHG | HEART RATE: 83 BPM | HEIGHT: 73 IN | BODY MASS INDEX: 24.52 KG/M2 | OXYGEN SATURATION: 98 % | WEIGHT: 185 LBS | TEMPERATURE: 98 F

## 2022-07-21 DIAGNOSIS — M54.12 CERVICAL RADICULOPATHY: Primary | ICD-10-CM

## 2022-07-21 DIAGNOSIS — L98.9 LESION OF SKIN OF FACE: ICD-10-CM

## 2022-07-21 PROCEDURE — 96372 THER/PROPH/DIAG INJ SC/IM: CPT | Performed by: NURSE PRACTITIONER

## 2022-07-21 PROCEDURE — 63600175 PHARM REV CODE 636 W HCPCS: Mod: ER | Performed by: NURSE PRACTITIONER

## 2022-07-21 PROCEDURE — 99284 EMERGENCY DEPT VISIT MOD MDM: CPT | Mod: ER

## 2022-07-21 RX ORDER — LIDOCAINE 50 MG/G
1 PATCH TOPICAL DAILY
Qty: 15 PATCH | Refills: 0 | OUTPATIENT
Start: 2022-07-21 | End: 2022-08-04

## 2022-07-21 RX ORDER — KETOROLAC TROMETHAMINE 30 MG/ML
30 INJECTION, SOLUTION INTRAMUSCULAR; INTRAVENOUS
Status: COMPLETED | OUTPATIENT
Start: 2022-07-21 | End: 2022-07-21

## 2022-07-21 RX ORDER — IBUPROFEN 600 MG/1
600 TABLET ORAL EVERY 6 HOURS PRN
Qty: 20 TABLET | Refills: 0 | Status: SHIPPED | OUTPATIENT
Start: 2022-07-21 | End: 2023-01-13

## 2022-07-21 RX ORDER — DEXAMETHASONE SODIUM PHOSPHATE 4 MG/ML
8 INJECTION, SOLUTION INTRA-ARTICULAR; INTRALESIONAL; INTRAMUSCULAR; INTRAVENOUS; SOFT TISSUE
Status: COMPLETED | OUTPATIENT
Start: 2022-07-21 | End: 2022-07-21

## 2022-07-21 RX ORDER — ORPHENADRINE CITRATE 100 MG/1
100 TABLET, EXTENDED RELEASE ORAL 2 TIMES DAILY PRN
Qty: 20 TABLET | Refills: 0 | Status: SHIPPED | OUTPATIENT
Start: 2022-07-21 | End: 2022-07-31

## 2022-07-21 RX ADMIN — DEXAMETHASONE SODIUM PHOSPHATE 8 MG: 4 INJECTION INTRA-ARTICULAR; INTRALESIONAL; INTRAMUSCULAR; INTRAVENOUS; SOFT TISSUE at 12:07

## 2022-07-21 RX ADMIN — KETOROLAC TROMETHAMINE 30 MG: 30 INJECTION, SOLUTION INTRAMUSCULAR; INTRAVENOUS at 12:07

## 2022-07-21 NOTE — FIRST PROVIDER EVALUATION
Emergency Department TeleTriage Encounter Note      CHIEF COMPLAINT    Chief Complaint   Patient presents with    Fall    Shoulder Injury     Bilateral shoulder pain, states tripped going down steps and caught self, pulling both arms, reports right shoulder more than left, with numbness to 4th and 5th fingers intermittent       VITAL SIGNS   Initial Vitals [07/21/22 1001]   BP Pulse Resp Temp SpO2   129/86 87 19 98.3 °F (36.8 °C) 97 %      MAP       --            ALLERGIES    Review of patient's allergies indicates:  No Known Allergies    PROVIDER TRIAGE NOTE  This is a teletriage evaluation of a 47 y.o. male presenting to the ED complaining of arm pain. Patient reports bilateral shoulder pain after catching himself during a fall 2 days ago. He reports numbness in his right arm.      Initial orders will be placed and care will be transferred to an alternate provider when patient is roomed for a full evaluation. Any additional orders and the final disposition will be determined by that provider.           ORDERS  Labs Reviewed - No data to display    ED Orders (720h ago, onward)    None            Virtual Visit Note: The provider triage portion of this emergency department evaluation and documentation was performed via [employer] a HIPAA-compliant telemedicine application, in concert with a tele-presenter in the room. A face to face patient evaluation with one of my colleagues will occur once the patient is placed in an emergency department room.      DISCLAIMER: This note was prepared with [employer] voice recognition transcription software. Garbled syntax, mangled pronouns, and other bizarre constructions may be attributed to that software system.

## 2022-07-21 NOTE — DISCHARGE INSTRUCTIONS
You have been prescribed NORFLEX for pain. Please do not take this medication while working, drinking alcohol, swimming, or while driving/operating heavy machinery. This medication may cause drowsiness, impair judgment, and reduce physical capabilities.    You have been prescribed IBUPROFEN for pain. This is an Non-Steroidal Anti-Inflammatory (NSAID) Medication. Please do not take any additional NSAIDs while you are taking this medication including (Advil, Aleve, Motrin, Ibuprofen, Mobic\meloxicam, Naprosyn, etc.). Please stop taking this medication if you experience: weakness, itching, yellow skin or eyes, joint pains, vomiting blood, blood or black stools, unusual weight gain, or swelling in your arms, legs, hands, or feet.     Please return to the Emergency Department for any new or worsening symptoms including: fever, chest pain, shortness of breath, loss of consciousness, dizziness, weakness, or any other concerns.     Please follow up with your Primary Care Provider within the week. If you do not have one, you may contact the one listed on your discharge paperwork or you may also call the [facility] Appointment Desk at [phone] to schedule an appointment with one.     Please take all medication as prescribed.

## 2022-07-21 NOTE — ED PROVIDER NOTES
Encounter Date: 7/21/2022    SCRIBE #1 NOTE: I, Donya Reyes, am scribing for, and in the presence of,  Manda Conteh NP. I have scribed the following portions of the note - Other sections scribed: HPI, ROS, PE.       History     Chief Complaint   Patient presents with    Fall    Shoulder Injury     Bilateral shoulder pain, states tripped going down steps and caught self, pulling both arms, reports right shoulder more than left, with numbness to 4th and 5th fingers intermittent     47 y.o. male with no pertinent medical history who presents to the ER with chief complaint of acute numbness radiating from the right side of the neck, down his arm, to the right 4th and 5th digits with bilateral shoulder pain for a few days. He reports tripping on steps and catching himself on the railing with symptoms beginning after that. Numbness in his right side is exacerbated by certain movements. He has not taken anything for pain.  Patient also complains of a bump behind his left jaw, present for several weeks. He reports a similar bump that was on his right jaw which was cut off. No further complaints at this time.    The history is provided by the patient. No  was used.     Review of patient's allergies indicates:  No Known Allergies  History reviewed. No pertinent past medical history.  History reviewed. No pertinent surgical history.  History reviewed. No pertinent family history.  Social History     Tobacco Use    Smoking status: Current Some Day Smoker     Packs/day: 0.50     Types: Cigars    Smokeless tobacco: Never Used    Tobacco comment: black & mild   Substance Use Topics    Alcohol use: Yes     Comment: weekends    Drug use: No     Review of Systems   Constitutional: Negative for fever.   HENT: Negative for sore throat.    Eyes: Negative for pain.   Respiratory: Negative for shortness of breath.    Cardiovascular: Negative for chest pain.   Gastrointestinal: Negative for diarrhea and  vomiting.   Genitourinary: Negative for dysuria.   Musculoskeletal: Positive for arthralgias (shoulders).   Skin: Negative for rash.        (+) Bump behind jaw.   Neurological: Positive for numbness. Negative for syncope.   All other systems reviewed and are negative.      Physical Exam     Initial Vitals [07/21/22 1001]   BP Pulse Resp Temp SpO2   129/86 87 19 98.3 °F (36.8 °C) 97 %      MAP       --         Physical Exam    Vitals reviewed.  Constitutional: He appears well-developed and well-nourished. He is not diaphoretic.  Non-toxic appearance. He does not have a sickly appearance. He does not appear ill. No distress.   HENT:   Head: Normocephalic and atraumatic.       Right Ear: External ear normal.   Left Ear: External ear normal.   Neck: Trachea normal and phonation normal. Neck supple.   C-spine cleared.  There is no midline tenderness of the cervical spine.  5/5 strength of the bilateral upper lower extremities with sensation intact.  Full range of motion of cervical spine, right shoulder, right elbow, right wrist.   Normal range of motion.   Full passive range of motion without pain.     Cardiovascular: Normal rate, regular rhythm, normal heart sounds and intact distal pulses.   Pulmonary/Chest: Breath sounds normal. No respiratory distress.   Abdominal: Abdomen is soft. Bowel sounds are normal.   Musculoskeletal:         General: Normal range of motion.      Cervical back: Full passive range of motion without pain, normal range of motion and neck supple.     Neurological: He is alert and oriented to person, place, and time. GCS eye subscore is 4. GCS verbal subscore is 5. GCS motor subscore is 6.   Skin: Skin is warm, dry and intact. No rash noted. No erythema.   Psychiatric: He has a normal mood and affect. Thought content normal.         ED Course   Procedures  Labs Reviewed - No data to display       Imaging Results    None          Medications   dexamethasone injection 8 mg (8 mg Intramuscular Given  7/21/22 1200)   ketorolac injection 30 mg (30 mg Intramuscular Given 7/21/22 1200)     Medical Decision Making:   History:   Old Medical Records: I decided to obtain old medical records.  ED Management:  47-year-old male presenting to the ED with neck pain, right arm pain, numbness, tingling.  Physical exam reassuring.  History and exam consistent with cervical radiculopathy.  He has no midline tenderness of the cervical spine concerning for underlying fracture, dislocation, subluxation.  Given Toradol and steroid injection in the ED.  I will discharge with NSAIDs and muscle relaxers.  Referral placed for pain management.  Will also place referral for outpatient follow-up with general surgery for facial lesion I suspect to be cyst versus lipoma.          Scribe Attestation:   Scribe #1: I performed the above scribed service and the documentation accurately describes the services I performed. I attest to the accuracy of the note.                Scribe attestation: I, MIGUEL Conteh, personally performed the services described in this documentation. All medical record entries made by the scribe were at my direction and in my presence.  I have reviewed the chart and agree that the record reflects my personal performance and is accurate and complete.  Clinical Impression:   Final diagnoses:  [M54.12] Cervical radiculopathy (Primary)  [L98.9] Lesion of skin of face          ED Disposition Condition    Discharge Stable        ED Prescriptions     Medication Sig Dispense Start Date End Date Auth. Provider    ibuprofen (ADVIL,MOTRIN) 600 MG tablet Take 1 tablet (600 mg total) by mouth every 6 (six) hours as needed for Pain. 20 tablet 7/21/2022  Manda Conteh NP    orphenadrine (NORFLEX) 100 mg tablet Take 1 tablet (100 mg total) by mouth 2 (two) times daily as needed for Muscle spasms or Pain. 20 tablet 7/21/2022 7/31/2022 Manda Conteh NP    LIDOcaine (LIDODERM) 5 % Place 1 patch onto the skin once daily. Remove &  Discard patch within 12 hours or as directed by MD 15 patch 7/21/2022  Manda Conteh NP        Follow-up Information     Follow up With Specialties Details Why Contact Info    [facility] Pain Medicine Schedule an appointment as soon as possible for a visit  For follow-up---neck pain [address]  [facility] [address] [phone]    Melquiades Umanzor MD General Surgery, Oncology Schedule an appointment as soon as possible for a visit  For follow-up----skin lesion on face [address] [place] [address]  [address]  [facility] [address] [phone]      Artur Fofana MD Family Medicine Schedule an appointment as soon as possible for a visit  For follow-up [address] [facility] [address] [phone]      [facility] ED Emergency Medicine Go to  If symptoms worsen [address] [facility] [address] [phone]           Manda Conteh NP  07/21/22 2038

## 2022-07-25 ENCOUNTER — TELEPHONE (OUTPATIENT)
Dept: SURGERY | Facility: CLINIC | Age: 47
End: 2022-07-25
Payer: COMMERCIAL

## 2022-07-25 NOTE — TELEPHONE ENCOUNTER
Spoke with Mr. Nicolas regarding msg, scheduled appt with Dr. Lindsey for 8/1 at 9. Pt confirmed date and time.      ----- Message from Praful Galvan MA sent at 7/25/2022 12:25 PM CDT -----  Type: Patient Call Back    Who called: Self    What is the request in detail: pt. Has a referral from the ED that says pending Auth. He wants to know can he still schedule with the dept..     Can the clinic reply by [facility]?no    Would the patient rather a call back or a response via [facility]? yes    Best call back number: [phone]

## 2022-07-28 ENCOUNTER — HOSPITAL ENCOUNTER (EMERGENCY)
Facility: HOSPITAL | Age: 47
Discharge: HOME | End: 2022-07-28
Attending: EMERGENCY MEDICINE
Payer: COMMERCIAL

## 2022-07-28 VITALS
DIASTOLIC BLOOD PRESSURE: 69 MMHG | TEMPERATURE: 98 F | HEART RATE: 87 BPM | HEIGHT: 73 IN | SYSTOLIC BLOOD PRESSURE: 148 MMHG | WEIGHT: 185 LBS | RESPIRATION RATE: 18 BRPM | OXYGEN SATURATION: 98 % | BODY MASS INDEX: 24.52 KG/M2

## 2022-07-28 DIAGNOSIS — G62.9 NEUROPATHY: ICD-10-CM

## 2022-07-28 DIAGNOSIS — R52 PAIN: ICD-10-CM

## 2022-07-28 DIAGNOSIS — M62.838 MUSCLE SPASM: Primary | ICD-10-CM

## 2022-07-28 PROCEDURE — 96372 THER/PROPH/DIAG INJ SC/IM: CPT | Performed by: EMERGENCY MEDICINE

## 2022-07-28 PROCEDURE — 63600175 PHARM REV CODE 636 W HCPCS: Mod: ER | Performed by: EMERGENCY MEDICINE

## 2022-07-28 PROCEDURE — 99284 EMERGENCY DEPT VISIT MOD MDM: CPT | Mod: ER

## 2022-07-28 RX ORDER — GABAPENTIN 100 MG/1
100 CAPSULE ORAL NIGHTLY
Qty: 7 CAPSULE | Refills: 0 | OUTPATIENT
Start: 2022-07-28 | End: 2022-08-04

## 2022-07-28 RX ORDER — KETOROLAC TROMETHAMINE 30 MG/ML
15 INJECTION, SOLUTION INTRAMUSCULAR; INTRAVENOUS
Status: COMPLETED | OUTPATIENT
Start: 2022-07-28 | End: 2022-07-28

## 2022-07-28 RX ADMIN — KETOROLAC TROMETHAMINE 15 MG: 30 INJECTION, SOLUTION INTRAMUSCULAR; INTRAVENOUS at 01:07

## 2022-07-28 NOTE — ED PROVIDER NOTES
Encounter Date: 7/28/2022    SCRIBE #1 NOTE: I, Imani Garcia, am scribing for, and in the presence of,  Inocencia Diaz MD. I have scribed the following portions of the note - Other sections scribed: HPI; ROS; PE.       History     Chief Complaint   Patient presents with    Neck Injury     Complains of R sided neck pain that radiates down R arm after arm got pulled back 3 days ago. Seen in ED for this issue and given Norflex and Ibuprofen. States no relief with meds.     Bebeto Nicolas is a 47 y.o. male with no known medical problems who presents to the ED for chief complaint of right-sided neck pain that radiates down into the right arm after having an injury 3 days ago. Patient reports that he was seen at this facility 4 days ago and was prescribed Orphenadrine 100 mg and Ibuprofen 600 mg which he has attempted treatment with but has had no relief. He states that while trying to go down the steps, he started to slip and grabbed onto the staircase rails to prevent himself from slipping any further. Patient reports that after this incident, he started experiencing the pain similar to his complaints today. He has tingling from his neck/shoulder radiating down his arm. Patient denies chest pain or shortness of breath.  Patient denies any other complaints at this time.    The history is provided by the patient. No  was used.     Review of patient's allergies indicates:  No Known Allergies  History reviewed. No pertinent past medical history.  History reviewed. No pertinent surgical history.  History reviewed. No pertinent family history.  Social History     Tobacco Use    Smoking status: Current Some Day Smoker     Packs/day: 0.50     Types: Cigars    Smokeless tobacco: Never Used    Tobacco comment: black & mild   Substance Use Topics    Alcohol use: Yes     Comment: weekends    Drug use: No     Review of Systems   Constitutional: Negative for chills and fever.   HENT: Negative for  congestion, ear pain, rhinorrhea and sore throat.    Eyes: Negative for redness.   Respiratory: Negative for shortness of breath and stridor.    Cardiovascular: Negative for chest pain.   Gastrointestinal: Negative for abdominal pain, constipation, diarrhea, nausea and vomiting.   Genitourinary: Negative for dysuria, frequency, hematuria and urgency.   Musculoskeletal: Positive for neck pain. Negative for back pain.   Skin: Negative for rash.   Neurological: Positive for numbness. Negative for dizziness, speech difficulty, weakness and light-headedness.   Hematological: Does not bruise/bleed easily.   Psychiatric/Behavioral: Negative for confusion.   All other systems reviewed and are negative.      Physical Exam     Initial Vitals [07/28/22 1247]   BP Pulse Resp Temp SpO2   124/71 75 14 97.8 °F (36.6 °C) 98 %      MAP       --         Physical Exam    Nursing note and vitals reviewed.  Constitutional: He appears well-developed and well-nourished. No distress.   HENT:   Head: Normocephalic and atraumatic.   Eyes: Conjunctivae and EOM are normal. Pupils are equal, round, and reactive to light.   Neck: Neck supple.   No midline cervical tenderness noted on exam.   Normal range of motion.  Cardiovascular: Normal rate, regular rhythm, normal heart sounds and intact distal pulses. Exam reveals no gallop and no friction rub.    No murmur heard.  Pulmonary/Chest: Breath sounds normal. No stridor. No respiratory distress. He has no wheezes. He has no rhonchi. He has no rales. He exhibits no tenderness.   Abdominal: Abdomen is soft. Bowel sounds are normal. He exhibits no distension and no mass. There is no abdominal tenderness. There is no rebound and no guarding.   Musculoskeletal:         General: Tenderness present. No edema. Normal range of motion.      Cervical back: Normal range of motion and neck supple. Spasms and tenderness present.      Comments: Right upper trapezius tenderness with spasms.     Neurological: He  is alert and oriented to person, place, and time.   Tingling/paresthesias noted on right forearm and right dorsal ulnar aspect of the hand exam.  Normal strength/normal  strength.    Otherwise unremarkable neuro exam.  Moving all 4 extremities spontaneously and ambulating ED without difficulty.   Skin: Skin is warm and dry.   Psychiatric: He has a normal mood and affect. His behavior is normal. Judgment and thought content normal.         ED Course   Procedures  Labs Reviewed - No data to display       Imaging Results          X-Ray Cervical Spine AP And Lateral (Final result)  Result time 07/28/22 13:32:47    Final result by Hoang Martinez MD (07/28/22 13:32:47)                 Impression:      No evidence of acute fracture or traumatic listhesis of the cervical spine.      Electronically signed by: Hoang Martinez MD  Date:    07/28/2022  Time:    13:32             Narrative:    EXAMINATION:  XR CERVICAL SPINE AP LATERAL    CLINICAL HISTORY:  Pain, unspecified    TECHNIQUE:  AP, lateral and open mouth views of the cervical spine were performed.    COMPARISON:  None.    FINDINGS:  The predental space is maintained.  No prevertebral soft tissue swelling is identified.    There is reversal of the normal cervical lordosis.  The vertebral body heights are maintained. The posterior elements are unremarkable.  The lateral masses of C1 are nondisplaced.  The intervertebral disc spaces are within normal limits.  There is a large anterior osteophyte at the C5-C6 level.  There is no evidence of acute fracture or listhesis of the cervical spine.    The paraspinal soft tissues are unremarkable.                               X-Ray Shoulder Complete 2 View Right (Final result)  Result time 07/28/22 13:20:50    Final result by Ponce Barcenas MD (07/28/22 13:20:50)                 Impression:      No acute displaced fracture-dislocation identified.      Electronically signed by: Ponce Barcenas MD  Date:    07/28/2022  Time:    13:20              Narrative:    EXAMINATION:  XR SHOULDER COMPLETE 2 OR MORE VIEWS RIGHT    CLINICAL HISTORY:  Pain, unspecified    TECHNIQUE:  Two or three views of the right shoulder were performed.    COMPARISON:  Chest radiograph 6/26/2022    FINDINGS:  Bones are well mineralized.  Overall alignment is within normal limits. No displaced fracture, dislocation or destructive osseous process. Joint spaces appear relatively maintained. No subcutaneous emphysema or radiodense retained foreign body.                                 Medications   ketorolac injection 15 mg (15 mg Intramuscular Given 7/28/22 9437)     Medical Decision Making:   History:   Old Medical Records: I decided to obtain old medical records.  Initial Assessment:   Bebeto Nicolas is a 47 y.o. male with no known medical problems who presents to the ED for chief complaint of right-sided neck pain that radiates down into the right arm after having an injury 3 days ago.  Differential Diagnosis:   Includes but not limited to fracture versus dislocation versus sprain versus strain versus paresthesia related to muscle spasm.  Independently Interpreted Test(s):   I have ordered and independently interpreted X-rays - see prior notes.  Clinical Tests:   Radiological Study: Ordered and Reviewed  ED Management:  Exam consistent with muscle strain with associated muscle spasm and paresthesia.  X-ray imaging negative for any acute changes.  Patient has Norflex and ibuprofen for pain medications.  Will add Neurontin at this time.  Discussed need to follow-up in the clinic with primary care for any specialty follow-up the primary care place referrals for.  Patient verbalized understanding.  Will discharge at this time as stated with return precautions and symptomatic treatment.  Patient verbalized understanding agrees with plan of care.    1:42 PM 7/28/2022  Raymundo Diaz MD      DISCLAIMER: This note was prepared with [employer] voice recognition transcription software.  Garbled syntax, mangled pronouns, and other bizarre constructions may be attributed to that software system             Scribe Attestation:   Scribe #1: I performed the above scribed service and the documentation accurately describes the services I performed. I attest to the accuracy of the note.               I, Dr. Raymundo Diaz, personally performed the services described in this documentation. All medical record entries made by the scribe were at my direction and in my presence.  I have reviewed the chart and agree that the record reflects my personal performance and is accurate and complete. Raymundo Diaz MD.  1:30 PM 07/28/2022  Clinical Impression:   Final diagnoses:  [R52] Pain  [M62.838] Muscle spasm (Primary)  [G62.9] Neuropathy - acquired          ED Disposition Condition    Discharge Stable        ED Prescriptions     Medication Sig Dispense Start Date End Date Auth. Provider    gabapentin (NEURONTIN) 100 MG capsule Take 1 capsule (100 mg total) by mouth every evening. for 7 days 7 capsule 7/28/2022 8/4/2022 Raymundo Diaz MD        Follow-up Information     Follow up With Specialties Details Why Contact Info Additional Information    [facility] ED Emergency Medicine  As needed, If symptoms worsen [address] [id number] [phone]     [facility] Family Medicine Schedule an appointment as soon as possible for a visit in 1 day  [address] [address] [phone] 2nd Floor           Raymundo Diaz MD  07/28/22 3097

## 2022-08-01 ENCOUNTER — OFFICE VISIT (OUTPATIENT)
Dept: SURGERY | Facility: CLINIC | Age: 47
End: 2022-08-01
Payer: COMMERCIAL

## 2022-08-01 VITALS
HEIGHT: 73 IN | BODY MASS INDEX: 23.52 KG/M2 | SYSTOLIC BLOOD PRESSURE: 126 MMHG | HEART RATE: 69 BPM | OXYGEN SATURATION: 98 % | WEIGHT: 177.5 LBS | DIASTOLIC BLOOD PRESSURE: 86 MMHG

## 2022-08-01 DIAGNOSIS — R22.1 NODULE OF SKIN OF NECK: Primary | ICD-10-CM

## 2022-08-01 PROCEDURE — 99203 PR OFFICE/OUTPT VISIT, NEW, LEVL III, 30-44 MIN: ICD-10-PCS | Mod: S$GLB,,, | Performed by: SURGERY

## 2022-08-01 PROCEDURE — 99999 PR PBB SHADOW E&M-EST. PATIENT-LVL IV: ICD-10-PCS | Mod: PBBFAC,,, | Performed by: SURGERY

## 2022-08-01 PROCEDURE — 99203 OFFICE O/P NEW LOW 30 MIN: CPT | Mod: S$GLB,,, | Performed by: SURGERY

## 2022-08-01 PROCEDURE — 99999 PR PBB SHADOW E&M-EST. PATIENT-LVL IV: CPT | Mod: PBBFAC,,, | Performed by: SURGERY

## 2022-08-01 NOTE — PROGRESS NOTES
46 y/o man with several month history of left sided skin lesion getting bigger, denies drainage or other symptoms.    PE: 2 mm palpable nodule at angle of mandible nontender with overlying hyperpigmentation.    Impression: facial lesion    Plan: refer to ENT

## 2022-08-03 PROCEDURE — 99284 EMERGENCY DEPT VISIT MOD MDM: CPT | Mod: 25

## 2022-08-04 ENCOUNTER — HOSPITAL ENCOUNTER (EMERGENCY)
Facility: HOSPITAL | Age: 47
Discharge: HOME | End: 2022-08-04
Attending: EMERGENCY MEDICINE
Payer: COMMERCIAL

## 2022-08-04 VITALS
WEIGHT: 178 LBS | SYSTOLIC BLOOD PRESSURE: 119 MMHG | RESPIRATION RATE: 18 BRPM | OXYGEN SATURATION: 95 % | DIASTOLIC BLOOD PRESSURE: 59 MMHG | HEART RATE: 74 BPM | HEIGHT: 73 IN | BODY MASS INDEX: 23.59 KG/M2 | TEMPERATURE: 98 F

## 2022-08-04 DIAGNOSIS — J02.9 SORE THROAT: ICD-10-CM

## 2022-08-04 DIAGNOSIS — M54.2 NECK PAIN: ICD-10-CM

## 2022-08-04 DIAGNOSIS — M54.12 CERVICAL RADICULOPATHY: Primary | ICD-10-CM

## 2022-08-04 LAB
CTP QC/QA: YES
CTP QC/QA: YES
MOLECULAR STREP A: NEGATIVE
SARS-COV-2 RDRP RESP QL NAA+PROBE: NEGATIVE

## 2022-08-04 PROCEDURE — 96372 THER/PROPH/DIAG INJ SC/IM: CPT | Performed by: PHYSICIAN ASSISTANT

## 2022-08-04 PROCEDURE — U0002 COVID-19 LAB TEST NON-CDC: HCPCS | Performed by: PHYSICIAN ASSISTANT

## 2022-08-04 PROCEDURE — 63600175 PHARM REV CODE 636 W HCPCS: Performed by: PHYSICIAN ASSISTANT

## 2022-08-04 PROCEDURE — 25000003 PHARM REV CODE 250: Performed by: PHYSICIAN ASSISTANT

## 2022-08-04 RX ORDER — KETOROLAC TROMETHAMINE 30 MG/ML
30 INJECTION, SOLUTION INTRAMUSCULAR; INTRAVENOUS
Status: COMPLETED | OUTPATIENT
Start: 2022-08-04 | End: 2022-08-04

## 2022-08-04 RX ORDER — CYCLOBENZAPRINE HCL 10 MG
10 TABLET ORAL 3 TIMES DAILY PRN
Qty: 20 TABLET | Refills: 0 | Status: SHIPPED | OUTPATIENT
Start: 2022-08-04 | End: 2022-08-11

## 2022-08-04 RX ORDER — GABAPENTIN 600 MG/1
300 TABLET ORAL NIGHTLY
Qty: 15 TABLET | Refills: 0 | Status: SHIPPED | OUTPATIENT
Start: 2022-08-04 | End: 2022-09-01 | Stop reason: SDUPTHER

## 2022-08-04 RX ORDER — MORPHINE SULFATE 4 MG/ML
6 INJECTION, SOLUTION INTRAMUSCULAR; INTRAVENOUS
Status: COMPLETED | OUTPATIENT
Start: 2022-08-04 | End: 2022-08-04

## 2022-08-04 RX ORDER — NAPROXEN 500 MG/1
500 TABLET ORAL 2 TIMES DAILY WITH MEALS
Qty: 30 TABLET | Refills: 0 | Status: SHIPPED | OUTPATIENT
Start: 2022-08-04 | End: 2023-01-13

## 2022-08-04 RX ORDER — ACETAMINOPHEN 500 MG
500 TABLET ORAL EVERY 4 HOURS PRN
Qty: 20 TABLET | Refills: 0 | Status: SHIPPED | OUTPATIENT
Start: 2022-08-04 | End: 2022-08-09

## 2022-08-04 RX ORDER — LIDOCAINE 50 MG/G
1 PATCH TOPICAL DAILY
Qty: 15 PATCH | Refills: 0 | Status: SHIPPED | OUTPATIENT
Start: 2022-08-04 | End: 2022-08-19

## 2022-08-04 RX ORDER — CYCLOBENZAPRINE HCL 10 MG
10 TABLET ORAL
Status: COMPLETED | OUTPATIENT
Start: 2022-08-04 | End: 2022-08-04

## 2022-08-04 RX ADMIN — KETOROLAC TROMETHAMINE 30 MG: 30 INJECTION, SOLUTION INTRAMUSCULAR at 12:08

## 2022-08-04 RX ADMIN — MORPHINE SULFATE 6 MG: 4 INJECTION INTRAVENOUS at 12:08

## 2022-08-04 RX ADMIN — CYCLOBENZAPRINE 10 MG: 10 TABLET, FILM COATED ORAL at 12:08

## 2022-08-04 NOTE — ED PROVIDER NOTES
Encounter Date: 8/3/2022       History     Chief Complaint   Patient presents with    Neck Pain     Pt presents to ED c/o right sided neck pain that radiates to right hand with numbness.  Pt was seen in ED on 07/28/22 for same symptoms.    Pt also c/ sore throat x 4 days.  Denies any other associated symptoms.  Pain 10/10.      CC: Neck Pain    HPI:   48 y/o M presenting for evaluation of 10/10 R sided neck pain radiating down the RUE with associated numbness/tingling to 4th and 5th digits of the R hand. He reports symptoms began after he tripped while walking on stairs and caught himself on the railing. Was evaluated at [facility] for these symptoms on 7/21/22 and was given toradol and decadron IM and prescribed norflex, ibuprofen lidoderm patches. He returned on 7/28/22 and had x-ray of the cervical spine showing large anterior osteophyte at the C5-C6 level with no fracture or acute abnormality.  He was prescribed gabapentin at that time. He states he has completed prescribed medications but still has persisting pain. Denies fever, chills, weakness, CP, SOB, dizziness, lightheadedness or other associated symptoms. 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     Pt also reports he has had sore throat for the past 4 days. Worse with swallowing. No sick contacts. No nasal congestion, rhinorrhea.         Review of patient's allergies indicates:  No Known Allergies  History reviewed. No pertinent past medical history.  History reviewed. No pertinent surgical history.  No family history on file.  Social History     Tobacco Use    Smoking status: Current Some Day Smoker     Packs/day: 0.50     Types: Cigars    Smokeless tobacco: Never Used    Tobacco comment: black & mild   Substance Use Topics    Alcohol use: Yes     Comment: weekends    Drug use: No     Review of Systems   Constitutional: Negative for chills and fever.   HENT: Negative for congestion, ear pain, rhinorrhea and  sore throat.    Eyes: Negative for redness.   Respiratory: Negative for shortness of breath and stridor.    Cardiovascular: Negative for chest pain.   Gastrointestinal: Negative for abdominal pain, constipation, diarrhea, nausea and vomiting.   Genitourinary: Negative for dysuria, frequency, hematuria and urgency.   Musculoskeletal: Positive for neck pain. Negative for back pain.   Skin: Negative for rash.   Neurological: Negative for dizziness, speech difficulty, weakness, light-headedness and numbness.   Hematological: Does not bruise/bleed easily.   Psychiatric/Behavioral: Negative for confusion.       Physical Exam     Initial Vitals [08/03/22 2359]   BP Pulse Resp Temp SpO2   (!) 119/59 74 18 97.5 °F (36.4 °C) 95 %      MAP       --         Physical Exam    Nursing note and vitals reviewed.  Constitutional: He appears well-developed and well-nourished. No distress.   HENT:   Head: Normocephalic.   Right Ear: External ear normal.   Left Ear: External ear normal.   Eyes: Conjunctivae are normal.   Cardiovascular: Normal rate, regular rhythm and intact distal pulses. Exam reveals no gallop and no friction rub.    No murmur heard.  Pulses:       Radial pulses are 2+ on the right side and 2+ on the left side.   Pulmonary/Chest: Breath sounds normal. No respiratory distress. He has no wheezes. He has no rhonchi. He has no rales. He exhibits no tenderness.   Musculoskeletal:         General: Normal range of motion.      Comments: Tenderness palpation of the right cervical paraspinous musculature.  No midline tenderness.  Cervical spine is neurologically intact.  Normal strength to bilateral upper extremities.     Neurological: He is alert. No sensory deficit.   Skin: Skin is warm and dry. No rash noted.   Psychiatric: He has a normal mood and affect. His behavior is normal. Judgment and thought content normal.         ED Course   Procedures  Labs Reviewed   POCT STREP A MOLECULAR   SARS-COV-2 RDRP GENE           Imaging Results    None          Medications   morphine injection 6 mg (6 mg Intramuscular Given 8/4/22 0035)   cyclobenzaprine tablet 10 mg (10 mg Oral Given 8/4/22 0036)   ketorolac injection 30 mg (30 mg Intramuscular Given 8/4/22 0036)     Medical Decision Making:   ED Management:  47-year-old male presenting for above complaints.  Patient presenting for neck pain.  Had x-ray imaging at this facility on 07/28 showing arthritic changes to the C-spine.  May be contributing to the patient's symptoms.  Think this is likely due to cervical radiculopathy.  Morphine IM Toradol IM and Flexeril p.o. given in the ED.  Will refer patient to Pain and Spine.  Additionally patient complaining of sore throat.  Strep and COVID negative.  No evidence of peritonsillar or retropharyngeal abscess or airway compromise. Follow up with pcp. Return to ER for wornsein gsymptoms or as needed                      Clinical Impression:   Final diagnoses:  [M54.12] Cervical radiculopathy (Primary)  [J02.9] Sore throat  [M54.2] Neck pain          ED Disposition Condition    Discharge Stable        ED Prescriptions     Medication Sig Dispense Start Date End Date Auth. Provider    naproxen (NAPROSYN) 500 MG tablet Take 1 tablet (500 mg total) by mouth 2 (two) times daily with meals. 30 tablet 8/4/2022  Betty Snyder PA-C    acetaminophen (TYLENOL) 500 MG tablet Take 1 tablet (500 mg total) by mouth every 4 (four) hours as needed. 20 tablet 8/4/2022 8/9/2022 Betty Snyder PA-C    cyclobenzaprine (FLEXERIL) 10 MG tablet Take 1 tablet (10 mg total) by mouth 3 (three) times daily as needed. 20 tablet 8/4/2022 8/11/2022 Betty Snyder PA-C    LIDOcaine (LIDODERM) 5 % Place 1 patch onto the skin once daily. Remove & Discard patch within 12 hours or as directed by MD. May use 4% over the counter if not covered by insurance for 15 days 15 patch 8/4/2022 8/19/2022 Betty Snyder PA-C    gabapentin (NEURONTIN) 600 MG  tablet Take 0.5 tablets (300 mg total) by mouth every evening. 15 tablet 8/4/2022 9/3/2022 Betty Snyder PA-C        Follow-up Information     Follow up With Specialties Details Why Contact Info    Your Primary Care Doctor  Schedule an appointment as soon as possible for a visit in 2 days for follow up     [facility] [place] [facility] Emergency Medicine Go to  As needed, If symptoms worsen [facility] [address]ita Rivera john  [facility] [id number] [phone]           Betty Snyder PA-C  08/04/22 0109

## 2022-08-04 NOTE — DISCHARGE INSTRUCTIONS

## 2022-12-07 ENCOUNTER — TELEPHONE (OUTPATIENT)
Dept: SPINE | Facility: CLINIC | Age: 47
End: 2022-12-07
Payer: COMMERCIAL

## 2022-12-07 NOTE — TELEPHONE ENCOUNTER
This message is for patient in regards to his or hers appointment 12/8/22 at 10:00a with Dr.Christine Puma M.D.    On the 4th floor [address] in the [facility] and spine [facility]. We do ask that patients arrive 15 minutes before appointment time.  Patient may contact [phone] if there is any questions or concerns. Thank you for entrusting in [facility] with your care.

## 2022-12-07 NOTE — PROGRESS NOTES
Subjective:      Patient ID: Bebeto Nicolas is a 47 y.o. male.    Chief Complaint: Neck Pain (Right side), Shoulder Pain (Right side), and Arm Pain (Right )    Mr Nicolas is a 48 yo male here for evaluation of arm numbness.  He reports symptoms began after he tripped while walking on stairs and caught himself on the railing. Was evaluated at [facility] for these symptoms on 7/21/22 and was given toradol and decadron IM and prescribed norflex, ibuprofen lidoderm patches. He returned on 7/28/22 and had x-ray of the cervical spine showing large anterior osteophyte at the C5-C6 level with no fracture or acute abnormality. He then returned to ER 8/4 and was referred to back and spine and pain.  The pain got better.  There is no pain.  It is just numbness and tingling in the right arm.  The numbness and tingling is not constant.  It is present daily.  It is with leaning elbow on something, lifting arm to play slots.  He will feel the tingling if lean head to the left, and lying in bed on stomach propped up.  There is no pain but it is not normal.  It can be shocking at times.  He has not been to PT.  He did take gabapentin and it helped.      X-ray cervical 7/2022  The predental space is maintained.  No prevertebral soft tissue swelling is identified.     There is reversal of the normal cervical lordosis.  The vertebral body heights are maintained. The posterior elements are unremarkable.  The lateral masses of C1 are nondisplaced.  The intervertebral disc spaces are within normal limits.  There is a large anterior osteophyte at the C5-C6 level.  There is no evidence of acute fracture or listhesis of the cervical spine.     The paraspinal soft tissues are unremarkable.     Impression:     No evidence of acute fracture or traumatic listhesis of the cervical spine.    No past medical history on file.    No past surgical history on file.    No family history on file.      Social History    Socioeconomic History      Marital  status: Single    Tobacco Use      Smoking status: Some Days        Packs/day: 0.50        Types: Cigars, Cigarettes      Smokeless tobacco: Never      Tobacco comments: black & mild    Substance and Sexual Activity      Alcohol use: Yes        Comment: weekends      Drug use: No      Sexual activity: Yes        Partners: Female      Current Outpatient Medications:  azithromycin (Z-JEN) 250 MG tablet, Take 2 tablets on day 1, then take 1 tablet on days 2-5, Disp: 6 tablet, Rfl: 0  diphenhydrAMINE-aluminum-magnesium hydroxide-simethicone-LIDOcaine HCl 2%, Swish and spit 15 mLs before meals, at bedtime and at 0200. 1:1:1 ratio benadryl, viscous 2%, maalox, Disp: 120 mL, Rfl: 0  erythromycin (ROMYCIN) ophthalmic ointment, Place a 1/2 inch ribbon of ointment into the lower eyelids once every 6 hours., Disp: 1 Tube, Rfl: 0  fluticasone propionate (FLONASE) 50 mcg/actuation nasal spray, 2 sprays (100 mcg total) by Each Nostril route once daily., Disp: 15.8 g, Rfl: 0  gabapentin (NEURONTIN) 600 MG tablet, Take 0.5 tablets (300 mg total) by mouth every evening., Disp: 90 tablet, Rfl: 0  ibuprofen (ADVIL,MOTRIN) 600 MG tablet, Take 1 tablet (600 mg total) by mouth every 6 (six) hours as needed for Pain., Disp: 20 tablet, Rfl: 0  naproxen (NAPROSYN) 500 MG tablet, Take 1 tablet (500 mg total) by mouth 2 (two) times daily with meals., Disp: 30 tablet, Rfl: 0  omeprazole (PRILOSEC) 40 MG capsule, Take 1 capsule (40 mg total) by mouth every morning., Disp: 30 capsule, Rfl: 0  promethazine-dextromethorphan (PROMETHAZINE-DM) 6.25-15 mg/5 mL Syrp, 1 teaspoon PO Q 8 hrs PRN cough. DO NOT DRIVE AFTER TAKING MED, Disp: 118 mL, Rfl: 0  rOPINIRole (REQUIP) 1 MG tablet, Take 1.5 tablets (1.5 mg total) by mouth every evening., Disp: 45 tablet, Rfl: 0  sildenafil (REVATIO) 20 mg Tab, Take 1 tablet (20 mg total) by mouth once daily., Disp: 30 tablet, Rfl: 0  triamcinolone acetonide 0.1% (KENALOG) 0.1 % cream, Apply topically 2 (two) times  daily. Apply to finger rash. Do not apply to the face. for 14 days, Disp: 15 g, Rfl: 0    No current facility-administered medications for this visit.      Review of patient's allergies indicates:  No Known Allergies        Review of Systems   Constitutional: Negative for weight gain and weight loss.   Cardiovascular:  Negative for chest pain.   Respiratory:  Negative for shortness of breath.    Musculoskeletal:  Negative for back pain, joint pain and joint swelling.   Gastrointestinal:  Negative for abdominal pain, bowel incontinence, nausea and vomiting.   Genitourinary:  Negative for bladder incontinence.   Neurological:  Positive for numbness (right arm and not hand) and paresthesias.       Objective:        General: Bebeto is well-developed, well-nourished, appears stated age, in no acute distress, alert and oriented to time, place and person.     General    Vitals reviewed.  Constitutional: He is oriented to person, place, and time. He appears well-developed and well-nourished.   HENT:   Head: Normocephalic and atraumatic.   Pulmonary/Chest: Effort normal.   Neurological: He is alert and oriented to person, place, and time.   Psychiatric: He has a normal mood and affect. His behavior is normal. Judgment and thought content normal.     General Musculoskeletal Exam   Gait: normal     Right Ankle/Foot Exam     Tests   Heel Walk: able to perform  Tiptoe Walk: able to perform    Left Ankle/Foot Exam     Tests   Heel Walk: able to perform  Tiptoe Walk: able to perform  Back (L-Spine & T-Spine) / Neck (C-Spine) Exam     Neck (C-Spine) Range of Motion   Flexion:      40  Extension:  40 (mild increase in tingling)  Right Lateral Bend: 20   Left Lateral Bend: 20   Right Rotation: 40   Left Rotation: 40     Spinal Sensation   Right Side Sensation  C-Spine Level: normal   L-Spine Level: normal  S-Spine Level: normal  Left Side Sensation  C-Spine Level: normal  L-Spine Level: normal  S-Spine Level: normal    Back (L-Spine  & T-Spine) Tests   Right Side Tests  Straight leg raise:        Sitting SLR: > 70 degrees    Left Side Tests  Straight leg raise:       Sitting SLR: > 70 degrees      Neck (C-Spine) Tests   Spurling's Test   Left:  Negative  Right: negative    Other   He has no scoliosis .  Spinal Kyphosis:  Absent      Right Hand/Wrist Exam     Tests   Phalens sign: positive  Tinel's sign (median nerve): positive        Left Hand/Wrist Exam     Tests   Phalens Sign: negative  Tinel's sign (median nerve): negative        Right Elbow Exam     Tests   Tinel's sign (cubital tunnel): positive      Left Elbow Exam     Tests   Tinel's sign (cubital tunnel): negative    Right Shoulder Exam     Inspection/Observation   Scapular Winging: absent    Left Shoulder Exam     Inspection/Observation   Scapular Winging: absent       Muscle Strength   Right Upper Extremity   Biceps: 5/5   Deltoid:  5/5  Triceps:  5/5  Wrist extension: 5/5   : 5/5   Finger Flexors:  5/5  Left Upper Extremity  Biceps: 5/5   Deltoid:  5/5  Triceps:  5/5  Wrist extension: 5/5   :  5/5   Finger Flexors:  5/5  Right Lower Extremity   Hip Flexion: 5/5   Quadriceps:  5/5   Anterior tibial:  5/5   EHL:  5/5  Left Lower Extremity   Hip Flexion: 5/5   Quadriceps:  5/5   Anterior tibial:  5/5   EHL:  5/5    Reflexes     Left Side  Biceps:  2+  Triceps:  2+  Brachioradialis:  2+  Achilles:  2+  Left Araiza's Sign:  Absent  Babinski Sign:  absent  Quadriceps:  2+    Right Side   Biceps:  2+  Triceps:  2+  Brachioradialis:  2+  Achilles:  2+  Right Araiza's Sign:  absent  Babinski Sign:  absent  Quadriceps:  2+    Vascular Exam     Right Pulses        Carotid:                  2+    Left Pulses        Carotid:                  2+            Assessment:       1. Neuralgia and neuritis    2. Cervical radiculopathy           Plan:       Orders Placed This Encounter    Ambulatory referral/consult to Physical/Occupational Therapy    EMG W/ ULTRASOUND AND NERVE CONDUCTION  TEST 2 Extremities    gabapentin (NEURONTIN) 600 MG tablet     We discussed arm tingling and that many things can cause arm tingling.  He does have some ulnar nerve irritation.  We discussed can also come from the neck.  He is doing much better than he was this summer.     We discussed posture sitting and the importance of trying to sit better.   We discussed watching where resting elbow,  we also discussed getting head over the spine.  We discussed a curve in the lower back and getting head over spine.    We discussed the benefits of therapy and exercise and continuing to move.  We discussed trying PT to help with symptoms and getting head over spine.  [place] but we discussed going and learning exercises to do on his own  Treat your own neck by kennedy lundberg  EMG right UE ulnar neuropathy vs radic  Gabapentin 300-600 at night  RTC 3 months    More than 50% of the total time  of 45 minutes was spent face to face in counseling on diagnosis and treatment options. I also counseled patient  on common and most usual side effect of prescribed medications.  I reviewed Primary care , and other specialty's notes to better coordinate patient's care. All questions were answered, and patient voiced understanding.         Follow-up: Follow up in about 3 months (around 3/8/2023). If there are any questions prior to this, the patient was instructed to contact the office.

## 2022-12-08 ENCOUNTER — OFFICE VISIT (OUTPATIENT)
Dept: SPINE | Facility: CLINIC | Age: 47
End: 2022-12-08
Attending: PHYSICAL MEDICINE & REHABILITATION
Payer: COMMERCIAL

## 2022-12-08 VITALS
WEIGHT: 180.75 LBS | HEART RATE: 87 BPM | DIASTOLIC BLOOD PRESSURE: 82 MMHG | TEMPERATURE: 99 F | SYSTOLIC BLOOD PRESSURE: 131 MMHG | HEIGHT: 73 IN | BODY MASS INDEX: 23.96 KG/M2

## 2022-12-08 DIAGNOSIS — M79.2 NEURALGIA AND NEURITIS: Primary | ICD-10-CM

## 2022-12-08 DIAGNOSIS — M54.12 CERVICAL RADICULOPATHY: ICD-10-CM

## 2022-12-08 PROCEDURE — 99999 PR PBB SHADOW E&M-EST. PATIENT-LVL V: ICD-10-PCS | Mod: PBBFAC,,, | Performed by: PHYSICAL MEDICINE & REHABILITATION

## 2022-12-08 PROCEDURE — 99204 OFFICE O/P NEW MOD 45 MIN: CPT | Mod: S$GLB,,, | Performed by: PHYSICAL MEDICINE & REHABILITATION

## 2022-12-08 PROCEDURE — 99204 PR OFFICE/OUTPT VISIT, NEW, LEVL IV, 45-59 MIN: ICD-10-PCS | Mod: S$GLB,,, | Performed by: PHYSICAL MEDICINE & REHABILITATION

## 2022-12-08 PROCEDURE — 99999 PR PBB SHADOW E&M-EST. PATIENT-LVL V: CPT | Mod: PBBFAC,,, | Performed by: PHYSICAL MEDICINE & REHABILITATION

## 2022-12-08 RX ORDER — GABAPENTIN 600 MG/1
300-600 TABLET ORAL NIGHTLY
Qty: 30 TABLET | Refills: 2 | OUTPATIENT
Start: 2022-12-08 | End: 2023-10-31 | Stop reason: SDUPTHER

## 2023-01-13 ENCOUNTER — HOSPITAL ENCOUNTER (EMERGENCY)
Facility: HOSPITAL | Age: 48
Discharge: HOME | End: 2023-01-13
Attending: EMERGENCY MEDICINE
Payer: COMMERCIAL

## 2023-01-13 VITALS
TEMPERATURE: 98 F | DIASTOLIC BLOOD PRESSURE: 84 MMHG | HEART RATE: 78 BPM | RESPIRATION RATE: 18 BRPM | OXYGEN SATURATION: 99 % | HEIGHT: 73 IN | SYSTOLIC BLOOD PRESSURE: 136 MMHG | WEIGHT: 182 LBS | BODY MASS INDEX: 24.12 KG/M2

## 2023-01-13 DIAGNOSIS — J06.9 VIRAL URI WITH COUGH: Primary | ICD-10-CM

## 2023-01-13 LAB — POC RAPID STREP A: NEGATIVE

## 2023-01-13 PROCEDURE — 99283 EMERGENCY DEPT VISIT LOW MDM: CPT | Mod: ER

## 2023-01-13 PROCEDURE — 25000003 PHARM REV CODE 250: Mod: ER | Performed by: EMERGENCY MEDICINE

## 2023-01-13 PROCEDURE — 63600175 PHARM REV CODE 636 W HCPCS: Mod: ER | Performed by: EMERGENCY MEDICINE

## 2023-01-13 RX ORDER — IBUPROFEN 400 MG/1
400 TABLET ORAL
Status: COMPLETED | OUTPATIENT
Start: 2023-01-13 | End: 2023-01-13

## 2023-01-13 RX ORDER — ACETAMINOPHEN 500 MG
1000 TABLET ORAL
Status: COMPLETED | OUTPATIENT
Start: 2023-01-13 | End: 2023-01-13

## 2023-01-13 RX ORDER — PREDNISONE 10 MG/1
10 TABLET ORAL DAILY
Qty: 5 TABLET | Refills: 0 | Status: SHIPPED | OUTPATIENT
Start: 2023-01-13 | End: 2023-01-18

## 2023-01-13 RX ORDER — PREDNISONE 20 MG/1
40 TABLET ORAL
Status: COMPLETED | OUTPATIENT
Start: 2023-01-13 | End: 2023-01-13

## 2023-01-13 RX ADMIN — ACETAMINOPHEN 1000 MG: 500 TABLET, FILM COATED ORAL at 02:01

## 2023-01-13 RX ADMIN — IBUPROFEN 400 MG: 400 TABLET ORAL at 02:01

## 2023-01-13 RX ADMIN — PREDNISONE 40 MG: 20 TABLET ORAL at 03:01

## 2023-01-13 NOTE — ED NOTES
Patient presents to ed for swollen glands with pain- reports had flu last week with a cough but only symptoms currently are cough and swollen glands- no problems swallowing. No drooling or respiratory issue noted.

## 2023-01-13 NOTE — ED PROVIDER NOTES
"Encounter Date: 1/13/2023       History     Chief Complaint   Patient presents with    General Illness     "Swollen glands in my neck" for 3 days. Denies any pain with swallowing or sore throat. No stridor/drooling. Pt also reports cough, headache and sinus drainage for over 7 days but improved with OTC meds.     This patient presents emergency department complaints of swollen glands in his neck for the last 3 days patient denies any pain with swallowing patient also reports cough headache and sinus drainage over the last 7 days.  The patient was diagnosed with influenza a week ago.    The history is provided by the patient.   Review of patient's allergies indicates:  No Known Allergies  No past medical history on file.  No past surgical history on file.  No family history on file.  Social History     Tobacco Use    Smoking status: Some Days     Packs/day: 0.50     Types: Cigars, Cigarettes    Smokeless tobacco: Never    Tobacco comments:     black & mild   Substance Use Topics    Alcohol use: Yes     Comment: weekends    Drug use: No     Review of Systems   HENT:  Positive for sore throat.    Neurological:  Positive for headaches.   All other systems reviewed and are negative.    Physical Exam     Initial Vitals [01/13/23 0219]   BP Pulse Resp Temp SpO2   136/84 79 14 97.9 °F (36.6 °C) 100 %      MAP       --         Physical Exam    Nursing note and vitals reviewed.  Constitutional: Vital signs are normal. He appears well-developed. He is active and cooperative.   HENT:   Head: Normocephalic and atraumatic.   Right Ear: Tympanic membrane normal.   Left Ear: Tympanic membrane normal.   Nose: Mucosal edema and rhinorrhea present.   Mouth/Throat: Uvula is midline and mucous membranes are normal. Posterior oropharyngeal edema and posterior oropharyngeal erythema present. No tonsillar abscesses.   Eyes: Conjunctivae, EOM and lids are normal. Pupils are equal, round, and reactive to light.   Neck: Trachea normal and " phonation normal. Neck supple. No thyroid mass present.       Normal range of motion.   Full passive range of motion without pain.     Cardiovascular:  Normal rate, regular rhythm, S1 normal, S2 normal, normal heart sounds, intact distal pulses and normal pulses.           Pulmonary/Chest: Effort normal and breath sounds normal.   Abdominal: Abdomen is soft and flat. Bowel sounds are normal. There is no abdominal tenderness.   Musculoskeletal:         General: Normal range of motion.      Cervical back: Full passive range of motion without pain, normal range of motion and neck supple. No edema, erythema or rigidity. No spinous process tenderness or muscular tenderness. Normal range of motion.     Lymphadenopathy:     He has no axillary adenopathy.   Neurological: He is alert and oriented to person, place, and time.   Skin: Skin is warm, dry and intact.   Psychiatric: He has a normal mood and affect. His speech is normal and behavior is normal. Judgment and thought content normal. Cognition and memory are normal.       ED Course   Procedures  Labs Reviewed   POCT STREP A, RAPID        Results for orders placed or performed during the hospital encounter of 01/13/23   POCT Rapid Strep A   Result Value Ref Range    POC Rapid Strep A negative Positive/Negative         Imaging Results    None          Medications   acetaminophen tablet 1,000 mg (1,000 mg Oral Given 1/13/23 0238)   ibuprofen tablet 400 mg (400 mg Oral Given 1/13/23 0238)   predniSONE tablet 40 mg (40 mg Oral Given 1/13/23 0301)                              Clinical Impression:   Final diagnoses:  [J06.9] Viral URI with cough (Primary)        ED Disposition Condition    Discharge Stable          ED Prescriptions       Medication Sig Dispense Start Date End Date Auth. Provider    predniSONE (DELTASONE) 10 MG tablet Take 1 tablet (10 mg total) by mouth once daily. for 5 days 5 tablet 1/13/2023 1/18/2023 Shant Singh MD          Follow-up Information        Follow up With Specialties Details Why Contact Info    Your PCP  Schedule an appointment as soon as possible for a visit in 1 week               Shant Singh MD  01/13/23 0693

## 2023-08-09 ENCOUNTER — HOSPITAL ENCOUNTER (EMERGENCY)
Facility: HOSPITAL | Age: 48
Discharge: HOME | End: 2023-08-09
Attending: EMERGENCY MEDICINE
Payer: COMMERCIAL

## 2023-08-09 VITALS
TEMPERATURE: 99 F | HEIGHT: 73 IN | WEIGHT: 190 LBS | BODY MASS INDEX: 25.18 KG/M2 | OXYGEN SATURATION: 98 % | HEART RATE: 68 BPM | SYSTOLIC BLOOD PRESSURE: 140 MMHG | RESPIRATION RATE: 18 BRPM | DIASTOLIC BLOOD PRESSURE: 83 MMHG

## 2023-08-09 DIAGNOSIS — R68.89 MULTIPLE COMPLAINTS: Primary | ICD-10-CM

## 2023-08-09 DIAGNOSIS — R10.9 LEFT FLANK PAIN: ICD-10-CM

## 2023-08-09 DIAGNOSIS — J02.9 PHARYNGITIS, UNSPECIFIED ETIOLOGY: ICD-10-CM

## 2023-08-09 DIAGNOSIS — M54.12 CERVICAL RADICULOPATHY: ICD-10-CM

## 2023-08-09 LAB
BILIRUB UR QL STRIP: NEGATIVE
CLARITY UR: CLEAR
COLOR UR: YELLOW
GLUCOSE UR QL STRIP: NEGATIVE
HGB UR QL STRIP: NEGATIVE
KETONES UR QL STRIP: NEGATIVE
LEUKOCYTE ESTERASE UR QL STRIP: NEGATIVE
NITRITE UR QL STRIP: NEGATIVE
PH UR STRIP: 6 [PH] (ref 5–8)
PROT UR QL STRIP: ABNORMAL
SP GR UR STRIP: 1.02 (ref 1–1.03)
URN SPEC COLLECT METH UR: ABNORMAL
UROBILINOGEN UR STRIP-ACNC: NEGATIVE EU/DL

## 2023-08-09 PROCEDURE — 96372 THER/PROPH/DIAG INJ SC/IM: CPT | Performed by: PHYSICIAN ASSISTANT

## 2023-08-09 PROCEDURE — 63600175 PHARM REV CODE 636 W HCPCS: Performed by: PHYSICIAN ASSISTANT

## 2023-08-09 PROCEDURE — 99284 EMERGENCY DEPT VISIT MOD MDM: CPT

## 2023-08-09 PROCEDURE — 81003 URINALYSIS AUTO W/O SCOPE: CPT | Performed by: PHYSICIAN ASSISTANT

## 2023-08-09 RX ORDER — ORPHENADRINE CITRATE 100 MG/1
100 TABLET, EXTENDED RELEASE ORAL 2 TIMES DAILY
Qty: 8 TABLET | Refills: 0 | Status: SHIPPED | OUTPATIENT
Start: 2023-08-09 | End: 2023-08-13

## 2023-08-09 RX ORDER — DEXAMETHASONE SODIUM PHOSPHATE 4 MG/ML
8 INJECTION, SOLUTION INTRA-ARTICULAR; INTRALESIONAL; INTRAMUSCULAR; INTRAVENOUS; SOFT TISSUE
Status: COMPLETED | OUTPATIENT
Start: 2023-08-09 | End: 2023-08-09

## 2023-08-09 RX ORDER — KETOROLAC TROMETHAMINE 30 MG/ML
30 INJECTION, SOLUTION INTRAMUSCULAR; INTRAVENOUS
Status: COMPLETED | OUTPATIENT
Start: 2023-08-09 | End: 2023-08-09

## 2023-08-09 RX ORDER — MELOXICAM 7.5 MG/1
7.5 TABLET ORAL DAILY
Qty: 12 TABLET | Refills: 0 | Status: SHIPPED | OUTPATIENT
Start: 2023-08-09 | End: 2023-10-31 | Stop reason: SDUPTHER

## 2023-08-09 RX ADMIN — DEXAMETHASONE SODIUM PHOSPHATE 8 MG: 4 INJECTION INTRA-ARTICULAR; INTRALESIONAL; INTRAMUSCULAR; INTRAVENOUS; SOFT TISSUE at 09:08

## 2023-08-09 RX ADMIN — KETOROLAC TROMETHAMINE 30 MG: 30 INJECTION, SOLUTION INTRAMUSCULAR; INTRAVENOUS at 09:08

## 2023-08-10 NOTE — ED PROVIDER NOTES
"Encounter Date: 8/9/2023    SCRIBE #1 NOTE: I, Miya Eldridge, am scribing for, and in the presence of,  Hiren Hernandez PA-C. I have scribed the following portions of the note - Other sections scribed: HPI, ROS.       History     Chief Complaint   Patient presents with    Neck Pain     Pt reports R sided neck pain x 1 week. States when moving neck it increases pain and sends pain to R shoulder. Reports having these s/s before and told having "nerve problems." Pt reports that he sleeps without pillows. Hx Cervical radiculopathy.  Pt also reports having a "burning sensation" to L side.      Bebeto Nicolas is a 48 y.o. male, with no pertinent PMHx, who presents to the ED with right sided neck pain radiating to the right shoulder for 1 week. Patient reports experiencing similar symptoms before and being notified of having "nerve problems". Patient endorses left flank pain for 4 days and describes the pain as burning. Patient endorses right arm numbness and redness to the throat. Patient reports gargling with Dr. Reed mouthwash. Patient denies any recent trauma, MVC, or worsening pain with movement. Denies testicular pain, dysuria, hematuria, constipation, diarrhea, congestion, cough, sore throat, back pain, or other associated symptoms.   Per external medical records, patient visit [facility] on December 8, 2022 for similar symptoms. Patient was diagnosed with neuralgia and neuritis.    The history is provided by the patient. No  was used.     Review of patient's allergies indicates:  No Known Allergies  No past medical history on file.  No past surgical history on file.  No family history on file.  Social History     Tobacco Use    Smoking status: Some Days     Current packs/day: 0.50     Types: Cigars, Cigarettes    Smokeless tobacco: Never    Tobacco comments:     black & mild   Substance Use Topics    Alcohol use: Yes     Comment: weekends    Drug use: No "     Review of Systems   Constitutional:  Negative for fever.   HENT:  Negative for congestion, sore throat and trouble swallowing.    Respiratory:  Negative for cough and shortness of breath.    Cardiovascular:  Negative for chest pain.   Gastrointestinal:  Negative for abdominal pain, constipation, diarrhea, nausea and vomiting.   Genitourinary:  Positive for flank pain (Left sided). Negative for dysuria, frequency, hematuria, testicular pain and urgency.   Musculoskeletal:  Positive for arthralgias (Right shoulder) and neck pain (Right sided). Negative for back pain.   Skin:  Negative for rash.   Neurological:  Positive for numbness (Right arm). Negative for headaches.   All other systems reviewed and are negative.      Physical Exam     Initial Vitals [08/09/23 1909]   BP Pulse Resp Temp SpO2   (!) 154/87 83 16 99 °F (37.2 °C) 97 %      MAP       --         Physical Exam    Nursing note and vitals reviewed.  Constitutional: He appears well-developed and well-nourished. He is not diaphoretic. No distress.   HENT:   Head: Atraumatic.   Right Ear: External ear normal.   Left Ear: External ear normal.   Mouth/Throat: Oropharynx is clear and moist. No oropharyngeal exudate.   Eyes: Conjunctivae are normal.   Neck: No tracheal deviation present.   Normal range of motion.  Cardiovascular:  Normal rate and regular rhythm.           Pulmonary/Chest: No accessory muscle usage or stridor. No tachypnea. No respiratory distress.   Abdominal: Abdomen is soft. He exhibits no distension. There is no abdominal tenderness.   No right CVA tenderness.  No left CVA tenderness. There is no rebound, no guarding, no tenderness at McBurney's point and negative Vergara's sign. negative Rovsing's sign  Musculoskeletal:      Cervical back: Normal range of motion.     Neurological: He has normal strength. He displays no tremor. He displays no seizure activity. Coordination and gait normal.   Skin: Skin is intact. Capillary refill takes less  than 2 seconds. No cyanosis.         ED Course   Procedures  Labs Reviewed   URINALYSIS, REFLEX TO URINE CULTURE - Abnormal; Notable for the following components:       Result Value    Protein, UA Trace (*)     All other components within normal limits    Narrative:     Specimen Source->Urine          Imaging Results    None          Medications   ketorolac injection 30 mg (30 mg Intramuscular Given 8/9/23 2106)   dexAMETHasone injection 8 mg (8 mg Intramuscular Given 8/9/23 2108)     Medical Decision Making:   History:   Old Medical Records: I decided to obtain old medical records.  Clinical Tests:   Lab Tests: Ordered and Reviewed  ED Management:  Multiple complaints.  Primarily here for right neck pain with associated radiculopathy.  History of similar symptoms in the past.  Prior cervical imaging shows osteophyte which could be contributing to symptoms.  Will likely benefit from MRI as an outpatient with Neurosurgery.  No deficits.  Equal  strength.  Radial pulses 2+ and equal.  No tenderness or neck vasculature.  Also has atraumatic left flank tenderness.  Unclear etiology.  Urinalysis unremarkable.  No abdominal involvement today.  Additional complaint of remote pharyngitis that has since resolved.  Oropharynx appears normal.  Declines rapid strep.          Scribe Attestation:   Scribe #1: I performed the above scribed service and the documentation accurately describes the services I performed. I attest to the accuracy of the note.                   I, Hiren Hernandez PA-C, personally performed the services described in this documentation. All medical record entries made by the scribe were at my direction and in my presence. I have reviewed the chart and agree that the record reflects my personal performance and is accurate and complete.    Clinical Impression:   Final diagnoses:  [M54.12] Cervical radiculopathy  [R10.9] Left flank pain  [J02.9] Pharyngitis, unspecified etiology  [R68.89] Multiple  complaints (Primary)        ED Disposition Condition    Discharge Stable          ED Prescriptions       Medication Sig Dispense Start Date End Date Auth. Provider    meloxicam (MOBIC) 7.5 MG tablet Take 1 tablet (7.5 mg total) by mouth once daily. 12 tablet 8/9/2023 -- Hiren Hernandez PA-C    orphenadrine (NORFLEX) 100 mg tablet Take 1 tablet (100 mg total) by mouth 2 (two) times daily. for 4 days 8 tablet 8/9/2023 8/13/2023 Hiren Hernandez PA-C          Follow-up Information       Follow up With Specialties Details Why Contact Info    [facility] -  Schedule an appointment as soon as possible for a visit in 1 day For reevaluation, AND to establish primary if you don't have a  [facility]  [facility] [id number]  [phone]      [facility] NEUROSURGERY Neurosurgery Schedule an appointment as soon as possible for a visit in 1 day For further evaluation, For more definitive management of your symptoms [address]  [facility] [address]  [phone]    [facility] Emergency Dept Emergency Medicine Go to  If symptoms worsen or new symptoms develop [address]  [facility] [id number] [phone]             Hiren Hernandez PA-C  08/09/23 4230

## 2023-08-10 NOTE — ED TRIAGE NOTES
"49 yo male presents to the ED with c/o neck pain with radiation down the right arm. Pt reports that he has been experiencing symptoms for some months now; reports that he has experienced this before "a while back". Pt denies any other symptoms and/or any PMH; rates pain at a 10 on a PRS of 0-10. Pt is AAO x 3 upon assessment; no distress noted at this time.   "

## 2023-08-10 NOTE — DISCHARGE INSTRUCTIONS
Thank you for coming to our Emergency Department today. It is important to remember that some problems or medical conditions are difficult to diagnose and may not be found or addressed during your Emergency Department visit.  These conditions often start with non-specific symptoms and can only be diagnosed on follow up visits with your primary care physician or specialist when the symptoms continue or change. Please remember that all medical conditions can change, and we cannot predict how you will be feeling tomorrow or the next day. Return to the ER with any questions/concerns, new/concerning symptoms, worsening or failure to improve.       Be sure to follow up with your primary care doctor and review all labs/imaging/tests that were performed during your ER visit with them. It is very common for us to identify non-emergent incidental findings which must be followed up with your primary care physician.  Some labs/imaging/tests may be outside of the normal range, and require non-emergent follow-up and/or further investigation/treatment/procedures/testing to help diagnose/exclude/prevent complications or other potentially serious medical conditions. Some abnormalities may not have been discussed or addressed during your ER visit.     An ER visit does not replace a primary care visit, and many screening tests or follow-up tests cannot be ordered by an ER doctor or performed by the ER. Some tests may even require pre-approval.    If you do not have a primary care doctor, you may contact the one listed on your discharge paperwork or you may also call the [facility] Appointment Desk at [phone] , or [address] [place] [address] at  [phone] to schedule an appointment, or establish care with a primary care doctor or even a specialist and to obtain information about local resources. It is important to your health that you have a primary care doctor.    Please take all medications as directed. We have done our best to select  a medication for you that will treat your condition however, all medications may potentially have side-effects and it is impossible to predict which medications may give you side-effects or what those side-effects (if any) those medications may give you.  If you feel that you are having a negative effect or side-effect of any medication you should stop taking those medications immediately and seek medical attention. If you feel that you are having a life-threatening reaction call 911.        Do not drive, swim, climb to height, take a bath, operate heavy machinery, drink alcohol or take potentially sedating medications, sign any legal documents or make any important decisions for 24 hours if you have received any pain medications, sedatives or mood altering drugs during your ER visit or within 24 hours of taking them if they have been prescribed to you.     You can find additional resources for Dentists, hearing aids, durable medical equipment, low cost pharmacies and other resources at [email or web address]

## 2024-05-31 ENCOUNTER — HOSPITAL ENCOUNTER (EMERGENCY)
Facility: HOSPITAL | Age: 49
Discharge: HOME | End: 2024-05-31
Attending: EMERGENCY MEDICINE
Payer: COMMERCIAL

## 2024-05-31 VITALS
SYSTOLIC BLOOD PRESSURE: 144 MMHG | TEMPERATURE: 99 F | DIASTOLIC BLOOD PRESSURE: 84 MMHG | BODY MASS INDEX: 25.25 KG/M2 | RESPIRATION RATE: 17 BRPM | OXYGEN SATURATION: 97 % | WEIGHT: 190.5 LBS | HEIGHT: 73 IN | HEART RATE: 80 BPM

## 2024-05-31 DIAGNOSIS — J02.0 STREP PHARYNGITIS: Primary | ICD-10-CM

## 2024-05-31 LAB — POC RAPID STREP A: POSITIVE

## 2024-05-31 PROCEDURE — 99284 EMERGENCY DEPT VISIT MOD MDM: CPT | Mod: ER

## 2024-05-31 PROCEDURE — 25000003 PHARM REV CODE 250: Mod: ER

## 2024-05-31 PROCEDURE — 87880 STREP A ASSAY W/OPTIC: CPT | Mod: ER

## 2024-05-31 RX ORDER — LIDOCAINE 50 MG/G
1 PATCH TOPICAL DAILY
Qty: 15 PATCH | Refills: 0 | Status: SHIPPED | OUTPATIENT
Start: 2024-05-31

## 2024-05-31 RX ORDER — AMOXICILLIN AND CLAVULANATE POTASSIUM 875; 125 MG/1; MG/1
1 TABLET, FILM COATED ORAL
Status: COMPLETED | OUTPATIENT
Start: 2024-05-31 | End: 2024-05-31

## 2024-05-31 RX ORDER — AMOXICILLIN AND CLAVULANATE POTASSIUM 875; 125 MG/1; MG/1
1 TABLET, FILM COATED ORAL 2 TIMES DAILY
Qty: 20 TABLET | Refills: 0 | Status: SHIPPED | OUTPATIENT
Start: 2024-05-31 | End: 2024-06-10

## 2024-05-31 RX ORDER — CETIRIZINE HYDROCHLORIDE 10 MG/1
10 TABLET ORAL DAILY
Qty: 30 TABLET | Refills: 0 | Status: SHIPPED | OUTPATIENT
Start: 2024-05-31 | End: 2024-06-30

## 2024-05-31 RX ORDER — GUAIFENESIN AND DEXTROMETHORPHAN HYDROBROMIDE 10; 100 MG/5ML; MG/5ML
5 SYRUP ORAL EVERY 6 HOURS
Qty: 473 ML | Refills: 0 | Status: SHIPPED | OUTPATIENT
Start: 2024-05-31 | End: 2024-06-10

## 2024-05-31 RX ORDER — ACETAMINOPHEN 500 MG
500 TABLET ORAL EVERY 6 HOURS PRN
Qty: 30 TABLET | Refills: 0 | Status: SHIPPED | OUTPATIENT
Start: 2024-05-31

## 2024-05-31 RX ORDER — PHENOL 1.4 %
AEROSOL, SPRAY (ML) MUCOUS MEMBRANE
Qty: 177 ML | Refills: 0 | Status: SHIPPED | OUTPATIENT
Start: 2024-05-31

## 2024-05-31 RX ADMIN — AMOXICILLIN AND CLAVULANATE POTASSIUM 1 TABLET: 875; 125 TABLET, FILM COATED ORAL at 05:05

## 2024-05-31 NOTE — ED PROVIDER NOTES
Encounter Date: 5/31/2024    SCRIBE #1 NOTE: IFIDELIA am scribing for, and in the presence of,  Christian Davis PA-C. I have scribed the following portions of the note - Other sections scribed: HPI, ROS.       History     Chief Complaint   Patient presents with    Sore Throat     Sore throat and left ear pain x 2 days      Bebeto Nicolas is a 49 y.o. male, with no pertinent PMHx, who presents to the ED with sore throat which started 2 days ago. Associated symptoms include left otalgia. He attempted treatment with Melita [employer] and Aleve to no relief. No other exacerbating or alleviating factors. Denies cough, congestion, rhinorrhea, hearing loss, or other associated symptoms.  Pt also complains of chronic back pain which has been worsening for the last few days.    The history is provided by the patient. No  was used.     Review of patient's allergies indicates:  No Known Allergies  No past medical history on file.  No past surgical history on file.  No family history on file.  Social History     Tobacco Use    Smoking status: Some Days     Current packs/day: 0.50     Types: Cigars, Cigarettes    Smokeless tobacco: Never    Tobacco comments:     black & mild   Substance Use Topics    Alcohol use: Yes     Comment: weekends    Drug use: No     Review of Systems   Constitutional:  Negative for chills, diaphoresis, fatigue and fever.   HENT:  Positive for ear pain (left) and sore throat. Negative for congestion, ear discharge, rhinorrhea and trouble swallowing.    Eyes:  Negative for photophobia, redness and visual disturbance.   Respiratory:  Negative for cough and shortness of breath.    Cardiovascular:  Negative for chest pain, palpitations and leg swelling.   Gastrointestinal:  Negative for abdominal pain, diarrhea, nausea and vomiting.   Genitourinary:  Negative for difficulty urinating, dysuria, flank pain, frequency and hematuria.   Musculoskeletal:  Positive for back pain. Negative for  arthralgias, myalgias, neck pain and neck stiffness.   Skin:  Negative for pallor and rash.   Neurological:  Negative for dizziness, weakness, light-headedness and headaches.   Hematological:  Does not bruise/bleed easily.       Physical Exam     Initial Vitals [05/31/24 1724]   BP Pulse Resp Temp SpO2   (!) 159/78 86 17 99.5 °F (37.5 °C) 99 %      MAP       --         Physical Exam    Nursing note and vitals reviewed.  Constitutional: He appears well-developed and well-nourished. He is not diaphoretic. He does not appear ill. No distress.   HENT:   Head: Normocephalic and atraumatic.   Right Ear: Tympanic membrane, external ear and ear canal normal.   Left Ear: Tympanic membrane, external ear and ear canal normal.   Nose: Mucosal edema and rhinorrhea present. Right sinus exhibits no maxillary sinus tenderness and no frontal sinus tenderness. Left sinus exhibits no maxillary sinus tenderness and no frontal sinus tenderness.   Mouth/Throat: Uvula is midline and mucous membranes are normal. No trismus in the jaw. No uvula swelling. Posterior oropharyngeal edema and posterior oropharyngeal erythema present. No oropharyngeal exudate or tonsillar abscesses.   Postnasal drip noted.  Uvula midline without any erythema or swelling.  No submandibular or sublingual swelling or erythema.  No trismus.  Normal jaw occlusion.  No evidence of tonsillar abscess.  No muffled voice.    Patient is tolerating secretions without difficulty.   Patient is speaking in full sentences on exam without difficulty.  There is no postauricular swelling, or overlying erythema or tenderness to palpation over mastoids bilaterally.   Eyes: Conjunctivae, EOM and lids are normal. Pupils are equal, round, and reactive to light. Right eye exhibits no discharge. Left eye exhibits no discharge. No scleral icterus.   Neck: Trachea normal. Neck supple.   Normal range of motion.   Full passive range of motion without pain.     Cardiovascular:  Normal rate,  regular rhythm, normal heart sounds and normal pulses.     Exam reveals no distant heart sounds and no friction rub.       Pulmonary/Chest: Effort normal and breath sounds normal. No respiratory distress.   Abdominal: Abdomen is soft. Bowel sounds are normal. He exhibits no distension and no pulsatile midline mass. There is no abdominal tenderness.   No right CVA tenderness.  No left CVA tenderness. There is no rebound and no guarding.   Musculoskeletal:         General: Normal range of motion.      Right shoulder: Normal.      Left shoulder: Normal.      Right elbow: Normal.      Left elbow: Normal.      Right wrist: Normal.      Left wrist: Normal.      Right hand: Normal.      Left hand: Normal.      Cervical back: Normal, full passive range of motion without pain, normal range of motion and neck supple. No edema, erythema or rigidity. No spinous process tenderness or muscular tenderness. Normal range of motion.      Thoracic back: Normal.      Lumbar back: Normal.      Right hip: Normal.      Left hip: Normal.      Right knee: Normal.      Left knee: Normal.      Right lower leg: Normal.      Left lower leg: Normal.      Right ankle: Normal.      Left ankle: Normal.      Right foot: Normal.      Left foot: Normal.     Lymphadenopathy:        Head (right side): No submental, no submandibular, no tonsillar, no preauricular, no posterior auricular and no occipital adenopathy present.        Head (left side): No submental, no submandibular, no tonsillar, no preauricular, no posterior auricular and no occipital adenopathy present.     He has cervical adenopathy.   Neurological: He is alert and oriented to person, place, and time. He has normal strength. No cranial nerve deficit or sensory deficit. Gait normal.   Skin: Skin is warm and dry. Capillary refill takes less than 2 seconds. No bruising, no ecchymosis and no rash noted. No erythema.   Psychiatric: He has a normal mood and affect. His speech is normal and  behavior is normal. Thought content normal.         ED Course   Procedures  Labs Reviewed   POCT STREP A, RAPID - Abnormal; Notable for the following components:       Result Value    POC Rapid Strep A positive (*)     All other components within normal limits   POCT STREP A MOLECULAR          Imaging Results    None          Medications   amoxicillin-clavulanate 875-125mg per tablet 1 tablet (1 tablet Oral Given 5/31/24 6153)     Medical Decision Making  Bebeto Nicolas is a 49 y.o. male, with no pertinent PMHx, who presents to the ED with sore throat which started 2 days ago. Associated symptoms include left otalgia. He attempted treatment with Melita Lolita and Aleve to no relief. No other exacerbating or alleviating factors. Denies cough, congestion, rhinorrhea, hearing loss, or other associated symptoms.  Pt also complains of chronic back pain which has been worsening for the last few days.    Patient's chart and medical history reviewed.  Patient's vitals reviewed.  They are afebrile, no respiratory distress, nontoxic-appearing in the ED.  Patient is a well-appearing 49 y.o. male.  Lung sounds are clear and not consistent with pneumonia. There is no neck pain or limited ROM to suggest retropharyngeal abscess or meningitis. Tolerating PO, non-toxic appearing, no hypoxia. The patient remained comfortable and stable during their visit in the ED.  Details of ED course documented in ED workup.     Differential Diagnosis is considered, but is not limited to: COVID, Flu, Strep throat, Viral URI, pneumonia, acute otitis media, otitis externa, mastoiditis, sinusitis, viral gastroenteritis, measles, roseola.  Also considered but less likely:  PTA/RPA: no hot potato voice, no uvular deviation, no pain with passive neck flexion.  Esophageal rupture: No history of dysphagia.  Unlikely deep space infection/Gustabo's, no submandibular or sublingual erythema or swelling.  Low suspicion for CNS infection/meningitis: no pain with  passive neck flexion, no neck rigidity/stiffness, no neck tenderness, negative Brudzinski.  Unlikely EBV as no splenomegaly.     Strep test Positive.  Patient provided with Augmentin here in the emergency department will be discharged home with a 10 day course for the treatment of strep pharyngitis.  All historical, clinical, and laboratory findings reviewed. Patient with constellation of symptoms most consistent with strep throat/pharyngitis. There are no concerning features on physical exam to suggest an emergent or life threatening condition or an invasive bacterial infection, including, but not limited to the conditions noted above. No further intervention is indicated at this time. The patient is at low risk for an emergent/life threatening medical condition at this time, and I am of the belief that that it is safe to discharge the patient from the emergency department.     Patient/family instructed to follow up with PCP/Pediatrician in 1-2 days for recheck of today's complaints. Patient/family has been counseled regarding the need for follow-up as well as the indications to return to the emergency room should new, worsening, continued or worrisome developments. Discharge and follow-up instructions discussed with the patient/family who expressed understanding and willingness to comply with recommendations. Patient discharged from the emergency department in stable condition, in no acute distress.  I discussed with the patient/family the diagnosis, treatment plan, indications for return to the emergency department, and for expected follow-up. The patient/family verbalized an understanding. The patient/family is asked if there are any questions or concerns. We discuss the case, until all issues are addressed to the patient/family's satisfaction. Patient/family understands and is agreeable to the plan.   VIKTORIYA HORNER PA-C    DISCLAIMER: This note was prepared with MModal voice recognition transcription  software. Garbled syntax, mangled pronouns, and other bizarre constructions may be attributed to that software system.        Amount and/or Complexity of Data Reviewed  Labs: ordered.    Risk  OTC drugs.  Prescription drug management.            Scribe Attestation:   Scribe #1: I performed the above scribed service and the documentation accurately describes the services I performed. I attest to the accuracy of the note.                             I, Michael Davis PA-C, personally performed the services described in this documentation.  All medical record entries made by the scribe were at my direction and in my presence.  I have reviewed the chart and agree that the record reflects my personal performance and is accurate and complete.    Clinical Impression:  Final diagnoses:  [J02.0] Strep pharyngitis (Primary)          ED Disposition Condition    Discharge Stable          ED Prescriptions       Medication Sig Dispense Start Date End Date Auth. Provider    amoxicillin-clavulanate 875-125mg (AUGMENTIN) 875-125 mg per tablet Take 1 tablet by mouth 2 (two) times daily. for 10 days 20 tablet 5/31/2024 6/10/2024 Michael Davis PA-C    LIDOcaine (LIDODERM) 5 % Place 1 patch onto the skin once daily. Apply patch for 12 hours and then leave off for 12 hours 15 patch 5/31/2024 -- Michael Davis PA-C    dextromethorphan-guaiFENesin  mg/5 ml (ROBITUSSIN-DM)  mg/5 mL liquid Take 5 mLs by mouth every 6 (six) hours. for 10 days 473 mL 5/31/2024 6/10/2024 Michael Davis PA-C    benzocaine-menthoL 6-10 mg lozenge Take 1 lozenge by mouth every 2 (two) hours as needed for Pain. 18 tablet 5/31/2024 -- Michael Daivs PA-C    acetaminophen (TYLENOL) 500 MG tablet Take 1 tablet (500 mg total) by mouth every 6 (six) hours as needed for Pain. 30 tablet 5/31/2024 -- Michael Davis PA-C    phenoL (CHLORASEPTIC THROAT SPRAY) 1.4 % SprA by Mucous Membrane route every 2 (two) hours. 177 mL 5/31/2024 -- Ryan  Michael JONES PA-C    cetirizine (ZYRTEC) 10 MG tablet Take 1 tablet (10 mg total) by mouth once daily. 30 tablet 5/31/2024 6/30/2024 Michael Davis PA-C          Follow-up Information       Follow up With Specialties Details Why Contact Info    [facility] -  Schedule an appointment as soon as possible for a visit in 1 day for follow up [address]  [place] [address]  [phone]      Your primary care physician  Schedule an appointment as soon as possible for a visit in 1 day for follow up              Michael Davis PA-C  05/31/24 2020

## 2024-05-31 NOTE — Clinical Note
"Bebeto Drakeony" Maria Isabel was seen and treated in our emergency department on 5/31/2024.  He may return to work on 06/03/2024.       If you have any questions or concerns, please don't hesitate to call.      Michael Davis PA-C"

## 2024-05-31 NOTE — DISCHARGE INSTRUCTIONS
Thank you for coming to our Emergency Department today. It is important to remember that some problems or medical conditions are difficult to diagnose and may not be found or addressed during your Emergency Department visit.  These conditions often start with non-specific symptoms and can only be diagnosed on follow up visits with your primary care physician or specialist when the symptoms continue or change. Please remember that all medical conditions can change, and we cannot predict how you will be feeling tomorrow or the next day. Return to the ER with any questions/concerns, new/concerning symptoms including fever, chest pain, shortness of breath, loss of consciousness, dizziness, weakness, worsening symptoms, failure to improve, or any other concerns. Also, please follow up with your Primary Care Physician and/or Pediatrician in the next 1-2 days to review your ED visit in entirety and for re-evaluation.   Be sure to follow up with your primary care doctor and review all labs/imaging/tests that were performed during your ER visit with them. It is very common for us to identify non-emergent incidental findings which must be followed up with your primary care physician.  Some labs/imaging/tests may be outside of the normal range, and require non-emergent follow-up and/or further investigation/treatment/procedures/testing to help diagnose/exclude/prevent complications or other potentially serious medical conditions. Some abnormalities may not have been discussed or addressed during your ER visit. Some lab results may not return during your ER visit but can be accessible by downloading the free [facility] or by visiting [email or web address] . It is important for you to review all labs/imaging/tests which are outside of the normal range with your physician.  An ER visit does not replace a primary care visit, and many screening tests or follow-up tests cannot be ordered by an ER doctor or performed by  the ER. Some tests may even require pre-approval.  If you do not have a primary care doctor, you may contact the one listed on your discharge paperwork or you may also call the [facility] Appointment Desk at [phone] , or [address] [place] [address] at  [phone] to schedule an appointment, or establish care with a primary care doctor or even a specialist and to obtain information about local resources. It is important to your health that you have a primary care doctor.  Please take all medications as directed. We have done our best to select a medication for you that will treat your condition however, all medications may potentially have side-effects and it is impossible to predict which medications may give you side-effects or what those side-effects (if any) those medications may give you.  If you feel that you are having a negative effect or side-effect of any medication you should stop taking those medications immediately and seek medical attention. If you feel that you are having a life-threatening reaction call 911.  Do not drive, swim, climb to height, take a bath, operate heavy machinery, drink alcohol or take potentially sedating medications, sign any legal documents or make any important decisions for 24 hours if you have received any pain medications, sedatives or mood altering drugs during your ER visit or within 24 hours of taking them if they have been prescribed to you.   You can find additional resources for Dentists, hearing aids, durable medical equipment, low cost pharmacies and other resources at [email or web address]  Patient agrees with this plan. Discussed with her strict return precautions, they verbalized understanding. Patient is stable for discharge.   § Please take all medication as prescribed.

## 2024-06-17 ENCOUNTER — TELEPHONE (OUTPATIENT)
Dept: PAIN MEDICINE | Facility: CLINIC | Age: 49
End: 2024-06-17
Payer: COMMERCIAL

## 2024-06-17 NOTE — TELEPHONE ENCOUNTER
Informed Mr. Tate that we received a referral from Dr. Maguire for a C7-T1 ESTEBAN; however, Dr. Gray will need to see the MRI images prior to scheduling.  Instructed pt to contact MRI facility and obtain a copy of the images, or ask that they send us a copy via mail or .

## 2024-06-18 ENCOUNTER — TELEPHONE (OUTPATIENT)
Dept: PAIN MEDICINE | Facility: CLINIC | Age: 49
End: 2024-06-18
Payer: COMMERCIAL

## 2024-06-18 NOTE — TELEPHONE ENCOUNTER
Spoke with pt. Pt stated he has his imaging disc. I stated the pt can drop off his imaging disc to our clinic anytime within our clinic hours and once the images are reviewed Elizabeth will reach out for scheduling. Pt verbalized understanding.      ----- Message from Alex Ortiz sent at 6/18/2024  2:56 PM CDT -----  Contact: Patient  Type:  Patient Call          Who Called: Patient         Does the patient know what this is regarding?: Requesting a call back to discuss his MRI disc that he's supposed bring into the office ; please  advise           Would the patient rather a call back or a response via [facility]?call           Best Call Back Number: [phone]             Additional Information:

## 2024-06-24 ENCOUNTER — TELEPHONE (OUTPATIENT)
Dept: PAIN MEDICINE | Facility: CLINIC | Age: 49
End: 2024-06-24
Payer: COMMERCIAL

## 2024-06-24 DIAGNOSIS — M50.30 DDD (DEGENERATIVE DISC DISEASE), CERVICAL: Primary | ICD-10-CM

## 2024-06-24 DIAGNOSIS — M54.12 CERVICAL RADICULOPATHY: ICD-10-CM

## 2024-06-24 NOTE — TELEPHONE ENCOUNTER
Avril to schedule for C7-T1 ESTEBAN ref: Ting per Dr. Gray- MRI disc was brought to hospital to be scanned into chart.     Reviewed pre-procedure instructions with Mr. Nicolas, as well as provided arrival time of 10:00a for 7/10/24.  All questions answered- verbalized understanding.

## 2024-07-06 ENCOUNTER — HOSPITAL ENCOUNTER (EMERGENCY)
Facility: HOSPITAL | Age: 49
Discharge: HOME | End: 2024-07-06
Attending: EMERGENCY MEDICINE
Payer: COMMERCIAL

## 2024-07-06 VITALS
WEIGHT: 189 LBS | HEIGHT: 73 IN | DIASTOLIC BLOOD PRESSURE: 96 MMHG | BODY MASS INDEX: 25.05 KG/M2 | TEMPERATURE: 98 F | OXYGEN SATURATION: 97 % | HEART RATE: 84 BPM | SYSTOLIC BLOOD PRESSURE: 150 MMHG | RESPIRATION RATE: 20 BRPM

## 2024-07-06 DIAGNOSIS — R19.7 DIARRHEA, UNSPECIFIED TYPE: ICD-10-CM

## 2024-07-06 DIAGNOSIS — R10.84 GENERALIZED ABDOMINAL CRAMPING: ICD-10-CM

## 2024-07-06 DIAGNOSIS — R11.0 NAUSEA: Primary | ICD-10-CM

## 2024-07-06 PROCEDURE — 25000003 PHARM REV CODE 250: Mod: ER | Performed by: EMERGENCY MEDICINE

## 2024-07-06 PROCEDURE — 99284 EMERGENCY DEPT VISIT MOD MDM: CPT | Mod: ER

## 2024-07-06 RX ORDER — ONDANSETRON 4 MG/1
4 TABLET, FILM COATED ORAL EVERY 8 HOURS PRN
Qty: 8 TABLET | Refills: 0 | Status: SHIPPED | OUTPATIENT
Start: 2024-07-06

## 2024-07-06 RX ORDER — DICYCLOMINE HYDROCHLORIDE 20 MG/1
20 TABLET ORAL EVERY 6 HOURS PRN
Qty: 10 TABLET | Refills: 0 | Status: SHIPPED | OUTPATIENT
Start: 2024-07-06 | End: 2024-08-05

## 2024-07-06 RX ORDER — HYDROCORTISONE ACETATE 25 MG/1
25 SUPPOSITORY RECTAL 2 TIMES DAILY
Qty: 12 SUPPOSITORY | Refills: 0 | Status: SHIPPED | OUTPATIENT
Start: 2024-07-06 | End: 2024-07-16

## 2024-07-06 RX ORDER — ONDANSETRON 4 MG/1
4 TABLET, ORALLY DISINTEGRATING ORAL
Status: COMPLETED | OUTPATIENT
Start: 2024-07-06 | End: 2024-07-06

## 2024-07-06 RX ADMIN — ONDANSETRON 4 MG: 4 TABLET, ORALLY DISINTEGRATING ORAL at 06:07

## 2024-07-06 NOTE — ED PROVIDER NOTES
Encounter Date: 7/6/2024       History     Chief Complaint   Patient presents with    Nausea     Pt c/o nausea, weakness, and diarrhea since yesterday; denies fever     HPI  This 49-year-old black male presents emergency room complaining of nausea some generalized weakness and 4 episodes of diarrhea over the course of the last 24 hours.  Has a little bit of blood-streaked stool.  He reports his rectum is sore.  He has very minimal abdominal cramping.  There is no painful urination.   Review of patient's allergies indicates:  No Known Allergies  No past medical history on file.  No past surgical history on file.  No family history on file.  Social History     Tobacco Use    Smoking status: Some Days     Current packs/day: 0.50     Types: Cigars, Cigarettes    Smokeless tobacco: Never    Tobacco comments:     black & mild   Substance Use Topics    Alcohol use: Yes     Comment: weekends    Drug use: No     Review of Systems  The patient was questioned specifically with regard to the following.  General: Fever, chills, sweats. Neuro: Headache. Eyes: eye problems. ENT: Ear pain, sore throat. Cardiovascular: Chest pain. Respiratory: Cough, shortness of breath. Gastrointestinal: Abdominal pain, vomiting, diarrhea. Genitourinary: Painful urination.  Musculoskeletal: Arm and leg problems. Skin: Rash.  The review of systems was negative except for the following:  Nausea diarrhea some blood-streaked stool  Physical Exam     Initial Vitals [07/06/24 0551]   BP Pulse Resp Temp SpO2   (!) 150/96 84 20 98.3 °F (36.8 °C) 97 %      MAP       --         Physical Exam  The patient was examined specifically for the following:   General:No significant distress, Good color, Warm and dry. Head and neck:Scalp atraumatic, Neck supple. Neurological:Appropriate conversation, Gross motor deficits. Eyes:Conjugate gaze, Clear corneas. ENT: No epistaxis. Cardiac: Regular rate and rhythm, Grossly normal heart tones. Pulmonary: Wheezing, Rales.  Gastrointestinal: Abdominal tenderness, Abdominal distention. Musculoskeletal: Extremity deformity, Apparent pain with range of motion of the joints. Skin: Rash.   The findings on examination were normal.  Lungs are clear.  The heart tones are normal.  The abdomen is soft.  Extremities are nontender there is no pain with range of motion any joints.  ED Course   Procedures  Labs Reviewed - No data to display       Imaging Results    None          Medications - No data to display  Medical Decision Making  Given the above this patient complains of 4 episodes of diarrhea.  A little blood-streaked stool.  Some nausea.  This is likely a viral syndrome.  The patient has had trouble with hemorrhoids in the past.  He requests steroid suppositories for his anal soreness.  I do not see any hemorrhoids on the physical examination.  The patient has good color.  I doubt significant anemia.  The patient was offered laboratory studies but declined.  I will treat for what is likely a viral gastroenteritis and some mild anal irritation from the diarrhea.                                  Clinical Impression:  Final diagnoses:  [R11.0] Nausea (Primary)  [R19.7] Diarrhea, unspecified type  [R10.84] Generalized abdominal cramping          ED Disposition Condition    Discharge Stable          ED Prescriptions       Medication Sig Dispense Start Date End Date Auth. Provider    ondansetron (ZOFRAN) 4 MG tablet Take 1 tablet (4 mg total) by mouth every 8 (eight) hours as needed for Nausea. 8 tablet 7/6/2024 -- Hansel Faria MD    dicyclomine (BENTYL) 20 mg tablet Take 1 tablet (20 mg total) by mouth every 6 (six) hours as needed (Abdominal cramping). 10 tablet 7/6/2024 8/5/2024 Hansel Faria MD    hydrocortisone (ANUSOL-HC) 25 mg suppository Place 1 suppository (25 mg total) rectally 2 (two) times daily. for 10 days 12 suppository 7/6/2024 7/16/2024 Hansel Faria MD          Follow-up Information       Follow up With Specialties  Details Why Contact Info    Zach Nieves MD Internal Medicine, Wound Care In 3 days  [address] [facility] [address]  [phone]               Hansel Faria MD  07/06/24 0624

## 2024-07-06 NOTE — Clinical Note
"Bebeto Drakeony" Maria Isabel was seen and treated in our emergency department on 7/6/2024.  He may return to work on 07/08/2024.       If you have any questions or concerns, please don't hesitate to call.      Hansel Faira MD"

## 2024-07-06 NOTE — DISCHARGE INSTRUCTIONS
Lots of clear liquids only while you have diarrhea nausea.  Medicines as directed.  Please return immediately if you get worse or if new problems develop.  Please follow-up with your primary care doctor, the primary care doctor above this week.  Rest.

## 2024-07-08 NOTE — DISCHARGE INSTRUCTIONS

## 2024-07-09 ENCOUNTER — TELEPHONE (OUTPATIENT)
Dept: PAIN MEDICINE | Facility: CLINIC | Age: 49
End: 2024-07-09
Payer: COMMERCIAL

## 2024-07-09 RX ORDER — ALPRAZOLAM 0.5 MG/1
1 TABLET, ORALLY DISINTEGRATING ORAL ONCE AS NEEDED
OUTPATIENT
Start: 2024-07-09 | End: 2035-12-05

## 2024-07-09 NOTE — TELEPHONE ENCOUNTER
LVM stating the pt's arrival time is for 10:00 am at [facility] [address] on the 2nd floor in Endoscopy.      ----- Message from Rachel Veloz sent at 7/9/2024 12:52 PM CDT -----  Regarding: Arrival Time  Patient called in regards to confirming arrival time and address for surgery 7/10.    Please call back to further [phone]

## 2024-07-10 ENCOUNTER — HOSPITAL ENCOUNTER (OUTPATIENT)
Facility: HOSPITAL | Age: 49
Discharge: HOME | End: 2024-07-10
Attending: PAIN MEDICINE | Admitting: PAIN MEDICINE
Payer: COMMERCIAL

## 2024-07-10 VITALS
DIASTOLIC BLOOD PRESSURE: 80 MMHG | OXYGEN SATURATION: 99 % | HEART RATE: 72 BPM | RESPIRATION RATE: 16 BRPM | SYSTOLIC BLOOD PRESSURE: 131 MMHG | TEMPERATURE: 98 F

## 2024-07-10 DIAGNOSIS — M54.12 CERVICAL RADICULOPATHY: Primary | ICD-10-CM

## 2024-07-10 PROCEDURE — 62321 NJX INTERLAMINAR CRV/THRC: CPT | Mod: ,,, | Performed by: PAIN MEDICINE

## 2024-07-10 PROCEDURE — 25000003 PHARM REV CODE 250: Performed by: PAIN MEDICINE

## 2024-07-10 PROCEDURE — 63600175 PHARM REV CODE 636 W HCPCS: Performed by: PAIN MEDICINE

## 2024-07-10 PROCEDURE — 62321 NJX INTERLAMINAR CRV/THRC: CPT | Performed by: PAIN MEDICINE

## 2024-07-10 PROCEDURE — 25500020 PHARM REV CODE 255: Performed by: PAIN MEDICINE

## 2024-07-10 RX ORDER — DEXAMETHASONE SODIUM PHOSPHATE 10 MG/ML
INJECTION INTRAMUSCULAR; INTRAVENOUS
Status: DISCONTINUED
Start: 2024-07-10 | End: 2024-07-10 | Stop reason: HOSPADM

## 2024-07-10 RX ORDER — LIDOCAINE HYDROCHLORIDE 10 MG/ML
INJECTION INFILTRATION; PERINEURAL
Status: DISCONTINUED
Start: 2024-07-10 | End: 2024-07-10 | Stop reason: HOSPADM

## 2024-07-10 RX ORDER — LIDOCAINE HYDROCHLORIDE 10 MG/ML
INJECTION, SOLUTION EPIDURAL; INFILTRATION; INTRACAUDAL; PERINEURAL
Status: DISCONTINUED
Start: 2024-07-10 | End: 2024-07-10 | Stop reason: HOSPADM

## 2024-07-10 RX ORDER — LIDOCAINE HYDROCHLORIDE 10 MG/ML
1 INJECTION, SOLUTION EPIDURAL; INFILTRATION; INTRACAUDAL; PERINEURAL ONCE
Status: COMPLETED | OUTPATIENT
Start: 2024-07-10 | End: 2024-07-10

## 2024-07-10 RX ORDER — DEXAMETHASONE SODIUM PHOSPHATE 10 MG/ML
10 INJECTION INTRAMUSCULAR; INTRAVENOUS ONCE
Status: COMPLETED | OUTPATIENT
Start: 2024-07-10 | End: 2024-07-10

## 2024-07-10 RX ORDER — LIDOCAINE HYDROCHLORIDE 10 MG/ML
20 INJECTION INFILTRATION; PERINEURAL ONCE
Status: COMPLETED | OUTPATIENT
Start: 2024-07-10 | End: 2024-07-10

## 2024-07-10 NOTE — PLAN OF CARE
Patient tolerated procedure well. Patient reports 7 /10 pain after procedure. Assisted patient up for first time. Gait steady. All discharge instructions given.

## 2024-07-10 NOTE — OP NOTE
Cervical Interlaminar Epidural Steroid Injection under fluoroscopic guidance.  Time-out taken to identify patient and procedure side prior to starting the procedure.   I attest that I have reviewed the patient's home medications prior to the procedure and no contraindication have been identified. I re-evaluated the patient after the patient was positioned for the procedure in the procedure room immediately before the procedural time-out. The vital signs are current and represent the current state of the patient which has not significantly changed since the preprocedure assessment.                                                              Date of Service: 07/10/2024    PCP: Unable, To Obtain    Referring Physician:    Procedure: C7-T1 cervical interlaminar epidural steroid injection under fluoroscopy.    Reasons for procedure: DDD (degenerative disc disease), cervical [M50.30]  Cervical radiculopathy [M54.12]    Physician: Shant Gray MD    ASSISTANTS: None    Medications injected:  Preservative-free dexamethasone 10mg and Xylocaine 1% MPF 1ml.  This was followed by a slow injection of 4 mL sterile, preservative-free normal saline.    Local anesthetic used: Xylocaine 1% 2ml.     Sedation Medications: None    Complications:  none.    Estimated blood loss: none.    Technique:  With the patient laying in a prone position with the neck in a flexed forward position, the area was prepped and draped in the usual sterile fashion using ChloraPrep and a fenestrated drape.  The area was determined under fluoroscopic guidance.  Local anesthetic was given using a 27-gauge needle by raising a wheal and going down to the osseous elements of the cervical spine.  A 3.5 inch 20-gauge Touhy needle was introduced under fluoroscopic guidance to meet the lamina of T1.  The needle was then hinged under the lamina then advanced using loss of resistance technique.  Once the tip of the needle was in the desired position, the contrast  dye Omnipaque was injected to determine placement and no vascular runoff.  Digital subtraction was employed to confirm that there is no vascular runoff.  The steroid was then injected slowly followed by a slow injection of the 4 mL of the sterile preservative-free normal saline.  The patient tolerated the procedure well.    Pain before the procedure: 9/10    Pain after the procedure: 6/10    The patient was monitored after the procedure and was given post-procedure and discharge instructions to follow at home. The patient was discharged in a stable condition

## 2024-07-10 NOTE — DISCHARGE SUMMARY
[facility] - Pain Management  Discharge Note  Short Stay    Procedure(s) (LRB):  C7-T1 Epidural Steroid Injection (Ref: Ting) (N/A)      OUTCOME: Patient tolerated treatment/procedure well without complication and is now ready for discharge.    DISPOSITION: Home or Self Care    FINAL DIAGNOSIS:  <principal problem not specified>    FOLLOWUP: In clinic    DISCHARGE INSTRUCTIONS:  No discharge procedures on file.       Discharge Diagnosis:DDD (degenerative disc disease), cervical [M50.30]  Cervical radiculopathy [M54.12]  Condition on Discharge: Stable.  Diet on Discharge: Same as before.  Activity: as per instruction sheet.  Discharge to: Home with a responsible adult.  Follow up: as per Discharge instructions

## 2024-10-05 ENCOUNTER — HOSPITAL ENCOUNTER (EMERGENCY)
Facility: HOSPITAL | Age: 49
Discharge: HOME | End: 2024-10-05
Attending: STUDENT IN AN ORGANIZED HEALTH CARE EDUCATION/TRAINING PROGRAM
Payer: COMMERCIAL

## 2024-10-05 VITALS
RESPIRATION RATE: 18 BRPM | DIASTOLIC BLOOD PRESSURE: 87 MMHG | HEART RATE: 75 BPM | WEIGHT: 185 LBS | TEMPERATURE: 99 F | BODY MASS INDEX: 24.52 KG/M2 | HEIGHT: 73 IN | SYSTOLIC BLOOD PRESSURE: 140 MMHG | OXYGEN SATURATION: 97 %

## 2024-10-05 DIAGNOSIS — R05.9 COUGH: ICD-10-CM

## 2024-10-05 DIAGNOSIS — R42 LIGHT HEADED: ICD-10-CM

## 2024-10-05 LAB
CTP QC/QA: YES
INFLUENZA A ANTIGEN, POC: NEGATIVE
INFLUENZA B ANTIGEN, POC: NEGATIVE
OHS QRS DURATION: 90 MS
OHS QTC CALCULATION: 375 MS
SARS-COV-2 RDRP RESP QL NAA+PROBE: NEGATIVE

## 2024-10-05 PROCEDURE — 99284 EMERGENCY DEPT VISIT MOD MDM: CPT | Mod: 25,ER

## 2024-10-05 PROCEDURE — 93005 ELECTROCARDIOGRAM TRACING: CPT | Mod: ER

## 2024-10-05 PROCEDURE — 87635 SARS-COV-2 COVID-19 AMP PRB: CPT | Mod: ER | Performed by: STUDENT IN AN ORGANIZED HEALTH CARE EDUCATION/TRAINING PROGRAM

## 2024-10-05 PROCEDURE — 93010 ELECTROCARDIOGRAM REPORT: CPT | Mod: ,,, | Performed by: INTERNAL MEDICINE

## 2024-10-05 PROCEDURE — 87804 INFLUENZA ASSAY W/OPTIC: CPT | Mod: ER

## 2024-10-05 RX ORDER — BENZONATATE 100 MG/1
200 CAPSULE ORAL 3 TIMES DAILY PRN
Qty: 20 CAPSULE | Refills: 0 | Status: SHIPPED | OUTPATIENT
Start: 2024-10-05 | End: 2024-10-15

## 2024-10-05 NOTE — ED PROVIDER NOTES
"Encounter Date: 10/5/2024       History     Chief Complaint   Patient presents with    Cough     "A lot of coughing and mucous" for approx 1 week.    Dizziness     Reports feeling "lightheaded" since getting up today to get ready for work.      49-year-old male presents for nonproductive cough ongoing for the past few weeks.  He also awoke with lightheadedness today while getting ready for work.  He was otherwise well, without medical problems or complaints.  He was requesting a note for work today.      Review of patient's allergies indicates:  No Known Allergies  History reviewed. No pertinent past medical history.  Past Surgical History:   Procedure Laterality Date    EPIDURAL STEROID INJECTION INTO CERVICAL SPINE N/A 7/10/2024    Procedure: C7-T1 Epidural Steroid Injection (Ref: Ting);  Surgeon: Shant Gray Jr., MD;  Location: [facility] PAIN MANAGEMENT;  Service: Pain Management;  Laterality: N/A;  @1000(given)  No ATC or DM  Needs Consent     No family history on file.  Social History     Tobacco Use    Smoking status: Some Days     Current packs/day: 0.50     Types: Cigars, Cigarettes    Smokeless tobacco: Never    Tobacco comments:     black & mild   Substance Use Topics    Alcohol use: Yes     Comment: weekends    Drug use: No     Review of Systems   Respiratory:  Positive for cough.    Neurological:  Positive for light-headedness.       Physical Exam     Initial Vitals [10/05/24 0332]   BP Pulse Resp Temp SpO2   (!) 149/92 86 18 98.5 °F (36.9 °C) 98 %      MAP       --         Physical Exam    Nursing note and vitals reviewed.  Constitutional: He appears well-developed and well-nourished.   HENT:   Head: Normocephalic and atraumatic. Mouth/Throat: Oropharynx is clear and moist.   Eyes: Conjunctivae and EOM are normal. Pupils are equal, round, and reactive to light.   Neck: No thyromegaly present.   Normal range of motion.  Cardiovascular:  Normal rate, regular rhythm and intact distal pulses.         "   Pulmonary/Chest: Breath sounds normal. No respiratory distress. He has no wheezes.   Abdominal: Abdomen is soft. Bowel sounds are normal. He exhibits no distension. There is no abdominal tenderness.   Musculoskeletal:         General: No tenderness or edema. Normal range of motion.      Cervical back: Normal range of motion.     Neurological: He is alert and oriented to person, place, and time. He has normal strength. No cranial nerve deficit.   Skin: Skin is warm and dry. No rash noted.   Psychiatric: He has a normal mood and affect. His behavior is normal. Thought content normal.         ED Course   Procedures  Labs Reviewed   SARS-COV-2 RDRP GENE       Result Value    POC Rapid COVID Negative       Acceptable Yes      Narrative:     This test utilizes isothermal nucleic acid amplification technology to detect the SARS-CoV-2 RdRp nucleic acid segment. The analytical sensitivity (limit of detection) is 500 copies/swab.     A POSITIVE result is indicative of the presence of SARS-CoV-2 RNA; clinical correlation with patient history and other diagnostic information is necessary to determine patient infection status.    A NEGATIVE result means that SARS-CoV-2 nucleic acids are not present above the limit of detection. A NEGATIVE result should be treated as presumptive. It does not rule out the possibility of COVID-19 and should not be the sole basis for treatment decisions. If COVID-19 is strongly suspected based on clinical and exposure history, re-testing using an alternate molecular assay should be considered.     Commercial kits are provided by [employer].        POCT INFLUENZA A/B MOLECULAR   POCT RAPID INFLUENZA A/B    Influenza B Ag negative      Inflenza A Ag negative       EKG Readings: (Independently Interpreted)   EKG with regular rate, regular rhythm, normal axis, no acute ST elevations or depressions, normal IL, QRS and QT interval. Interpreted by me.         Imaging Results               X-Ray Chest PA And Lateral (In process)                      Medications - No data to display  Medical Decision Making  49-year-old male presents for evaluation of cough, nonproductive, ongoing for past few weeks.  He also reported feeling lightheaded when he awoke today.  He no longer feels lightheaded.  He was able to ambulate without difficulty.  EKG shows sinus rhythm regular rate, normal [place], QRS QT intervals, doubt arrhythmia or underlying cardiac cause his symptoms.  Patient reported cough, so chest x-ray obtained, without evidence of acute abnormality, no evidence of pneumonia or lung parenchymal disease to explain patient's reported cough.  Patient observed here for an hour, he was cough free throughout the visit.  Flu and COVID were negative.  No further workup indicated at this time.  Patient requested a work note, which was provided.  He is stable for discharge.    Amount and/or Complexity of Data Reviewed  Labs: ordered.  Radiology: ordered.    Risk  Prescription drug management.                                      Clinical Impression:  Final diagnoses:  [R05.9] Cough  [R42] Light headed          ED Disposition Condition    Discharge Stable          ED Prescriptions       Medication Sig Dispense Start Date End Date Auth. Provider    benzonatate (TESSALON) 100 MG capsule Take 2 capsules (200 mg total) by mouth 3 (three) times daily as needed for Cough. 20 capsule 10/5/2024 10/15/2024 Manoj Mcclellan MD          Follow-up Information    None          Manoj Mcclellan MD  10/05/24 0741

## 2024-10-05 NOTE — Clinical Note
"Bebeto Banks" Maria Isabel was seen and treated in our emergency department on 10/5/2024.  He may return to work on 10/07/2024.       If you have any questions or concerns, please don't hesitate to call.       RN    " Mohs Histo Method Verbiage: Each section was then chromacoded and processed in the Mohs lab using the Mohs protocol and submitted for frozen section.

## 2024-10-05 NOTE — Clinical Note
"Bebeto Drakeony" Maria Isabel was seen and treated in our emergency department on 10/5/2024.  He may return to work on 10/06/2024.       If you have any questions or concerns, please don't hesitate to call.      Belle Purcell, RN"

## 2024-12-10 NOTE — H&P
[facility] Discharge Summary    Tello Jones MRN# [id number]   Age: 38 year old YOB: 1986     Date of Admission:  12/4/2024  Date of Discharge::  12/7/2024 12:48 PM  Admitting Physician:  Linda Smiley MD  Discharge Physician:  Davon Gordon MD     Home clinic: [facility]          Admission Diagnoses:   Laceration of spleen, initial encounter [S36.039A]  Alcohol withdrawal seizure with complication (H) [F10.939, R56.9]  Closed fracture of transverse process of lumbar vertebra, initial encounter (H) [S32.009A]          Discharge Diagnosis:     Active Problems:    Laceration of spleen, initial encounter    Alcohol withdrawal seizure with complication (H)    Closed fracture of transverse process of lumbar vertebra, initial encounter (H)           Procedures:   CT head without contrast  CT chest/abdomen/pelvis with contrast  EEG            Discharge Medications:     Discharge Medication List as of 12/7/2024 12:36 PM        START taking these medications    Details   acetaminophen (TYLENOL) 500 MG tablet Take 2 tablets (1,000 mg) by mouth 3 times daily., No Print Out      gabapentin (NEURONTIN) 300 MG capsule Take 1 capsule (300 mg) by mouth 3 times daily., Disp-90 capsule, R-0, E-Prescribe      ibuprofen (ADVIL/MOTRIN) 200 MG capsule Take 3 capsules (600 mg) by mouth 3 times daily., No Print OutWITH TYLENOL      !! multivitamin w/minerals (MULTIVITAMINS W/MINERALS) tablet Take 1 tablet by mouth daily., No Print Out      oxyCODONE (ROXICODONE) 5 MG tablet Take 3 tablets (15 mg) by mouth every 6 hours as needed for pain for 2 days, THEN 2 tablets (10 mg) every 6 hours as needed for pain for 2 days, THEN 1 tablet (5 mg) every 6 hours as needed for pain., Disp-48 tablet, R-0, E-Prescribe       !! - Potential duplicate medications found. Please discuss with provider.        CONTINUE these medications which have NOT CHANGED    Details   amitriptyline (ELAVIL) 50  Subjective:     Patient ID: Bebeto Nicolas is a 49 y.o. male    Chief Complaint: Neck pain    Referred by: Shant Gray Jr*      HPI:    Bebeto Nicolas is a 49 y.o. male who presents today for C7-T1 ILESI. He denies any changes in the quality or location of the pain.        Review of Systems   Constitutional:  Negative for activity change, appetite change, chills, fatigue, fever and unexpected weight change.   HENT:  Negative for hearing loss.    Eyes:  Negative for visual disturbance.   Respiratory:  Negative for chest tightness and shortness of breath.    Cardiovascular:  Negative for chest pain.   Gastrointestinal:  Negative for abdominal pain, constipation, diarrhea, nausea and vomiting.   Genitourinary:  Negative for difficulty urinating.   Musculoskeletal:  Positive for myalgias, neck pain and neck stiffness. Negative for back pain and gait problem.   Skin:  Negative for rash.   Neurological:  Positive for numbness. Negative for dizziness, weakness, light-headedness and headaches.   Psychiatric/Behavioral:  Positive for sleep disturbance. Negative for hallucinations and suicidal ideas. The patient is not nervous/anxious.        History reviewed. No pertinent past medical history.    History reviewed. No pertinent surgical history.    Social History     Socioeconomic History    Marital status: Single   Tobacco Use    Smoking status: Some Days     Current packs/day: 0.50     Types: Cigars, Cigarettes    Smokeless tobacco: Never    Tobacco comments:     black & mild   Substance and Sexual Activity    Alcohol use: Yes     Comment: weekends    Drug use: No    Sexual activity: Yes     Partners: Female       Review of patient's allergies indicates:  No Known Allergies    No current facility-administered medications on file prior to encounter.     Current Outpatient Medications on File Prior to Encounter   Medication Sig Dispense Refill    acetaminophen (TYLENOL) 500 MG tablet Take 1 tablet (500 mg total) by  MG tablet Take 50 mg by mouth at bedtime., Historical      !! multivitamin (CENTRUM SILVER) tablet Take 1 tablet by mouth daily, OTC      pantoprazole (PROTONIX) 40 MG EC tablet Take 40 mg by mouth daily., Historical      sertraline (ZOLOFT) 100 MG tablet Take 150 mg by mouth daily., Historical      traZODone (DESYREL) 100 MG tablet Take 200 mg by mouth at bedtime., Historical       !! - Potential duplicate medications found. Please discuss with provider.        STOP taking these medications       cetirizine (ZYRTEC) 10 MG tablet Comments:   Reason for Stopping:                     Consultations:   Consultation during this admission received from neurology and surgery          Brief History of Illness:   Tello Jones is a 38 year old male who presents following seizure at work.  Per report, patient's coworkers witnessed an approximate 3-minute seizure.      On arrival at the ED, the pt was awake and oriented and able to provide his own history.  He denies history of seizure.  He states he bit his lip.  He initially denies alcohol use, but with more questioning does admit to frequent use and states he has been trying to cut back.  He is reporting left-sided flank pain.            Hospital Course:   Tello Jones is a 38 year old male was initially admitted on 12/4/2024 after having an Etoh withdrawal seizure. He had initially been admitted by Gen Surgery due to trauma that occurred with his LOC and fall. He had been found to have Grade II splenic laceration and Left transverse process fractures of T12, L1 and L2.      PMH is notable for Etoh abuse.     Mr. Jones was admitted to a medical bed and was monitored on a CIWA protocol. He had no further sz activity and his withdrawal symptoms remained relatively mild. He had no drop in Hgb that would have been compatible with rebleeding from the traumatized spleen, but he did have significant back pain.     Due to the evident LOC (and especially in the setting of a  mouth every 6 (six) hours as needed for Pain. 30 tablet 0    amoxicillin-clavulanate 875-125mg (AUGMENTIN) 875-125 mg per tablet Take 1 tablet by mouth every 12 (twelve) hours. 10 tablet 0    benzocaine-menthoL 6-10 mg lozenge Take 1 lozenge by mouth every 2 (two) hours as needed for Pain. 18 tablet 0    cetirizine (ZYRTEC) 10 MG tablet Take 1 tablet (10 mg total) by mouth once daily. 30 tablet 0    gabapentin (NEURONTIN) 600 MG tablet Take 0.5 tablets (300 mg total) by mouth every evening. 30 tablet 2    LIDOcaine (LIDODERM) 5 % Place 1 patch onto the skin once daily. Apply patch for 12 hours and then leave off for 12 hours 15 patch 0    meloxicam (MOBIC) 7.5 MG tablet Take 1 tablet (7.5 mg total) by mouth once daily. 30 tablet 3    meloxicam (MOBIC) 7.5 MG tablet Take 1 tablet (7.5 mg total) by mouth once daily. 30 tablet 1    omeprazole (PRILOSEC) 40 MG capsule Take 1 capsule (40 mg total) by mouth every morning. 30 capsule 1    phenoL (CHLORASEPTIC THROAT SPRAY) 1.4 % SprA by Mucous Membrane route every 2 (two) hours. 177 mL 0    promethazine-dextromethorphan (PROMETHAZINE-DM) 6.25-15 mg/5 mL Syrp 1 teaspoon PO Q 8 hrs PRN cough. DO NOT DRIVE AFTER TAKING  mL 0    sildenafil (REVATIO) 20 mg Tab Take 1 tablet (20 mg total) by mouth once daily. 30 tablet 0    triamcinolone acetonide 0.1% (KENALOG) 0.1 % cream Apply topically 2 (two) times daily. Apply to finger rash. Do not apply to the face. for 14 days 15 g 0       Objective:      /80 (BP Location: Right arm, Patient Position: Sitting)   Pulse 72   Temp 98.3 °F (36.8 °C) (Oral)   Resp 16   SpO2 99%     Exam:  AAOx3 NAD  Mood and affect normal  Memory and language intact  Breathing non labored      Assessment:       Cervical radiculopathy      Plan:         Bebeto Nicolas is a 49 y.o. male with cervical radiculopathy.    Proceed with ESTEBAN as planned.      similar unwitnessed episode of LOC just in September), I instructed him that he is not permitted to drive. However, he drives for a living, and so I asked for Neurology to see him. An EEG was completed that did not suggest epilepsy, and Neurology did not make any further recommendations. I would like for the pt to be permitted to return to work as soon as is safe, and with this in mind, I have referred him to Neurology as an outpatient.     /87 (BP Location: Right arm)   Pulse 74   Temp 98.3  F (36.8  C) (Temporal)   Resp 18   Wt 84.5 kg (186 lb 4.8 oz)   SpO2 95%   BMI 25.27 kg/m    At the time of discharge, Mr. Jones is alert, coherent and in NAD. No tremor.   Chest: CTA;  COR: RRR without murmur  Ad: Soft, NTND  Extrem: no edema  Neuro: appropriate, up and ambulating with discomfort, but no apparent ataxia          Discharge Instructions and Follow-Up:     Discharge diet: Regular   Discharge activity: Activity as tolerated   Discharge follow-up: With neurology as soon as can be arranged.   In the next week with his Primary Clinic. I warned him that if he needs more pain medication, he is not likely to get it from anyone other than his primary clinic. And after a week, it seems unlikely that it should be needed. I was generous with the amount of Oxycodone I dispensed, but I specifically asked his partner, while the pt was in the room, to dispense the medication to him.  They both agreed with this plan.           Discharge Disposition:     Discharged to home      Attestation:  I have reviewed today's vital signs, notes, medications, labs and imaging.    Davon Gordon MD

## 2025-06-14 ENCOUNTER — HOSPITAL ENCOUNTER (EMERGENCY)
Facility: HOSPITAL | Age: 50
Discharge: HOME | End: 2025-06-14
Attending: EMERGENCY MEDICINE

## 2025-06-14 VITALS
TEMPERATURE: 98 F | WEIGHT: 190 LBS | OXYGEN SATURATION: 99 % | HEART RATE: 69 BPM | BODY MASS INDEX: 25.07 KG/M2 | DIASTOLIC BLOOD PRESSURE: 80 MMHG | RESPIRATION RATE: 14 BRPM | SYSTOLIC BLOOD PRESSURE: 149 MMHG

## 2025-06-14 DIAGNOSIS — M54.12 CERVICAL RADICULOPATHY: Primary | ICD-10-CM

## 2025-06-14 DIAGNOSIS — M54.2 NECK PAIN: ICD-10-CM

## 2025-06-14 PROCEDURE — 99284 EMERGENCY DEPT VISIT MOD MDM: CPT | Mod: 25

## 2025-06-14 PROCEDURE — 63600175 PHARM REV CODE 636 W HCPCS: Mod: JZ,TB

## 2025-06-14 PROCEDURE — 96372 THER/PROPH/DIAG INJ SC/IM: CPT

## 2025-06-14 RX ORDER — NAPROXEN 500 MG/1
500 TABLET ORAL 2 TIMES DAILY
Qty: 30 TABLET | Refills: 0 | Status: SHIPPED | OUTPATIENT
Start: 2025-06-14

## 2025-06-14 RX ORDER — DEXAMETHASONE SODIUM PHOSPHATE 4 MG/ML
8 INJECTION, SOLUTION INTRA-ARTICULAR; INTRALESIONAL; INTRAMUSCULAR; INTRAVENOUS; SOFT TISSUE
Status: COMPLETED | OUTPATIENT
Start: 2025-06-14 | End: 2025-06-14

## 2025-06-14 RX ORDER — METHOCARBAMOL 500 MG/1
1000 TABLET, FILM COATED ORAL 3 TIMES DAILY
Qty: 30 TABLET | Refills: 0 | Status: SHIPPED | OUTPATIENT
Start: 2025-06-14 | End: 2025-06-19

## 2025-06-14 RX ORDER — KETOROLAC TROMETHAMINE 30 MG/ML
30 INJECTION, SOLUTION INTRAMUSCULAR; INTRAVENOUS
Status: COMPLETED | OUTPATIENT
Start: 2025-06-14 | End: 2025-06-14

## 2025-06-14 RX ORDER — LIDOCAINE 50 MG/G
1 PATCH TOPICAL DAILY
Qty: 15 PATCH | Refills: 0 | Status: SHIPPED | OUTPATIENT
Start: 2025-06-14

## 2025-06-14 RX ORDER — PREDNISONE 10 MG/1
10 TABLET ORAL DAILY
Qty: 21 TABLET | Refills: 0 | Status: SHIPPED | OUTPATIENT
Start: 2025-06-14

## 2025-06-14 RX ADMIN — KETOROLAC TROMETHAMINE 30 MG: 30 INJECTION, SOLUTION INTRAMUSCULAR; INTRAVENOUS at 06:06

## 2025-06-14 RX ADMIN — DEXAMETHASONE SODIUM PHOSPHATE 8 MG: 4 INJECTION INTRA-ARTICULAR; INTRALESIONAL; INTRAMUSCULAR; INTRAVENOUS; SOFT TISSUE at 06:06

## 2025-06-14 NOTE — Clinical Note
"Bebeto Drakeony" Maria Isabel was seen and treated in our emergency department on 6/14/2025.  He may return to work on 06/16/2025.       If you have any questions or concerns, please don't hesitate to call.      Michael Davis PA-C"

## 2025-06-14 NOTE — ED PROVIDER NOTES
Encounter Date: 6/14/2025       History     Chief Complaint   Patient presents with    Neck Pain     To rt side of neck that radiates down shoulder and arm, denies injury, thinks he may have pulled something pain X 2 weeks     The history is provided by the patient and medical records.   50-year-old male past medical history of cervical radiculopathy, cervical DDD presenting to the emergency department today for evaluation of right-sided neck pain radiating down the right shoulder and arm for the past 2 weeks.  Patient states this feels similar to his exacerbation of neck pain last year for which she received a cervical steroid injection with resolution of symptoms.  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  Reports pain worse with turning his head to the right.  Reports that he has pain shooting down to his hand and numbness in the pinky and ring finger intermittently.  Has not taken any medications for his pain.  No other complaints at this time.  Denies any notable trauma or injury, headache, dizziness, weakness, nausea, vomiting, visual changes, chest pain, shortness for breath, fever, other associated symptoms.  Review of patient's allergies indicates:  No Known Allergies  History reviewed. No pertinent past medical history.  Past Surgical History:   Procedure Laterality Date    EPIDURAL STEROID INJECTION INTO CERVICAL SPINE N/A 7/10/2024    Procedure: C7-T1 Epidural Steroid Injection (Ref: Ting);  Surgeon: Shant Gray Jr., MD;  Location: [facility]IN [facility];  Service: Pain Management;  Laterality: N/A;  @1000(given)  No ATC or DM  Needs Consent     No family history on file.  Social History[1]  Review of Systems   Constitutional:  Negative for chills, diaphoresis, fatigue and fever.   HENT:  Negative for congestion, ear discharge, ear pain, rhinorrhea, sore throat and trouble swallowing.    Eyes:  Negative for  photophobia, redness and visual disturbance.   Respiratory:  Negative for cough and shortness of breath.    Cardiovascular:  Negative for chest pain, palpitations and leg swelling.   Gastrointestinal:  Negative for abdominal pain, diarrhea, nausea and vomiting.   Genitourinary:  Negative for difficulty urinating, dysuria, flank pain, frequency and hematuria.   Musculoskeletal:  Positive for neck pain (right sided radiating down right arm.). Negative for arthralgias, back pain, myalgias and neck stiffness.   Skin:  Negative for pallor and rash.   Neurological:  Positive for numbness (Intermittently right pinky and ring finger.). Negative for dizziness, weakness, light-headedness and headaches.   Hematological:  Does not bruise/bleed easily.       Physical Exam     Initial Vitals [06/14/25 0627]   BP Pulse Resp Temp SpO2   (!) 149/80 69 14 97.9 °F (36.6 °C) 99 %      MAP       --         Physical Exam    Nursing note and vitals reviewed.  Constitutional: He appears well-developed and well-nourished. He is not diaphoretic. He does not appear ill. No distress.   HENT:   Head: Normocephalic and atraumatic.   Right Ear: External ear normal.   Left Ear: External ear normal.   Nose: Nose normal. Mouth/Throat: Uvula is midline and oropharynx is clear and moist.   Eyes: Conjunctivae and EOM are normal. Pupils are equal, round, and reactive to light. Right eye exhibits no discharge. Left eye exhibits no discharge. No scleral icterus.   Neck: Trachea normal. Neck supple.   Full range of motion of the neck without limitation.  Patient endorses production of pain when turning his head to the right.  No midline tenderness.  No rigidity.    Normal range of motion.  Cardiovascular:  Normal rate, regular rhythm, normal heart sounds, intact distal pulses and normal pulses.     Exam reveals no distant heart sounds and no friction rub.       Pulmonary/Chest: Effort normal and breath sounds normal. No respiratory distress.   Abdominal:  Abdomen is soft. Bowel sounds are normal. He exhibits no distension and no pulsatile midline mass. There is no abdominal tenderness.   No right CVA tenderness.  No left CVA tenderness. There is no rebound and no guarding.   Musculoskeletal:         General: Normal range of motion.      Right shoulder: Normal.      Left shoulder: Normal.      Right elbow: Normal.      Left elbow: Normal.      Right wrist: Normal.      Left wrist: Normal.      Right hand: Normal.      Left hand: Normal.      Cervical back: Normal, normal range of motion and neck supple. No edema, erythema or rigidity. No spinous process tenderness or muscular tenderness. Normal range of motion.      Thoracic back: Normal.      Lumbar back: Normal.      Right hip: Normal.      Left hip: Normal.      Right knee: Normal.      Left knee: Normal.      Right lower leg: Normal.      Left lower leg: Normal.      Right ankle: Normal.      Left ankle: Normal.      Right foot: Normal.      Left foot: Normal.      Comments: Full range of motion of both upper extremities with 5/5 strength against resistance 2+ radial pulses and sensation intact bilaterally and equally.           Neurological: He is alert and oriented to person, place, and time. He has normal strength. No cranial nerve deficit or sensory deficit.   Cervical Nerve Exam Normal: Equal strength with upper extremities (thumbs up/down/side, fingers spread, wrist and elbow flexion/extension, arms crossed).    Skin: Skin is warm and dry. Capillary refill takes less than 2 seconds. No bruising, no ecchymosis and no rash noted. No erythema.   Psychiatric: He has a normal mood and affect. His speech is normal and behavior is normal. Thought content normal.         ED Course   Procedures  Labs Reviewed - No data to display       Imaging Results              X-Ray Cervical Spine AP And Lateral (Final result)  Result time 06/14/25 07:37:31      Final result by Finesse Foss MD (06/14/25 07:37:31)                    Impression:      Moderate cervical spondylosis as above, similar to prior.      Electronically signed by: Finesse Foss MD  Date:    06/14/2025  Time:    07:37               Narrative:    EXAMINATION:  XR CERVICAL SPINE AP LATERAL    CLINICAL HISTORY:  Cervicalgia    TECHNIQUE:  AP, lateral, and odontoid views of the cervical spine were performed.    COMPARISON:  07/28/2022    FINDINGS:  No evidence of a displaced fracture or osseous destructive process.  The odontoid is intact.    Reversal of the expected cervical lordosis without subtle anterolisthesis at C7-T1, similar to prior    Multilevel anterior osteophyte formation and degenerative endplate changes.  Subtle loss of disc height at C6-7 and C7-T1.  No new advanced disc height loss.  Scattered facet hypertrophy.                                       Medications   ketorolac injection 30 mg (30 mg Intramuscular Given 6/14/25 0648)   dexAMETHasone injection 8 mg (8 mg Intramuscular Given 6/14/25 0648)     Medical Decision Making  50-year-old male past medical history of cervical radiculopathy, cervical DDD presenting to the emergency department today for evaluation of right-sided neck pain radiating down the right shoulder and arm for the past 2 weeks.  Patient states this feels similar to his exacerbation of neck pain last year for which she received a cervical steroid injection with resolution of symptoms.  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  Reports pain worse with turning his head to the right.  Reports that he has pain shooting down to his hand and numbness in the pinky and ring finger intermittently.  Has not taken any medications for his pain.  No other complaints at this time.  Denies any notable trauma or injury, headache, dizziness, weakness, nausea, vomiting, visual changes, chest pain, shortness for breath, fever, other associated  symptoms.  Patient's chart and medical history reviewed.  Patient's vitals reviewed.  They are afebrile, no respiratory distress, nontoxic-appearing in the ED.  Differential diagnosis is considered as follows  - cervical radiculopathy: considered with patient Hx and known radiculopathy with presentation the same as previous exacerbations of his neck pain/cervical radiculopathy.   - MSK strain: considered with neck pain   - Spinal Fx: unlikely with normal neurovascular exam, no step-off or deformity on exam, no spinal TTP, no recent trauma, no Hx of osteoporosis.   - Discitis/Spinal abscess/Osteomyelitis: unlikely with no risk factors including current immunosuppression, hemodialysis, current or recent injection drug use, current or recent invasive epidural/spinal procedure, current or recent endocarditis or bacteremia. no spinal tenderness, no fever. no neuro deficits.  - stenosis/herniation: considered with neck pain and radicular symptoms.  - TOS: unlikely with presentation and lack of trauma, negative Adson's test.   Physical exam as above and consistent with radiculopathy, low suspicion for infectious etiology, low suspicion for stroke/CVA with normal neurological examination. Will evaluate with xray imaging with no imaging in past year along with steroid and Toradol for patients pain.   Xray imaging consistent with prior with no new emergent findings. Patient endorses improvement of pain after medications, plan to dc home with medications for pain as below. Advised follow up with his previous back and spine provider if symptoms continue, patient agrees.  At this time I'll discharge home to follow up with primary care physician in the next 1-2 days for further evaluation.  If the pain continues the pt will need to see back and spine for further evaluation.  The patient is comfortable with this plan and comfortable going home at this time. After taking into careful account the historical factors and physical exam  findings of the patient's presentation today, in conjunction with the empirical and objective data obtained on ED workup, no acute emergent medical condition has been identified. The patient appears to be low risk for an emergent medical condition and I feel it is safe and appropriate at this time for the patient to be discharged to follow-up as detailed in their discharge instructions for reevaluation and possible continued outpatient workup and management. I have discussed the specifics of the workup with the patient and the patient has verbalized understanding of the details of the workup, the diagnosis, the treatment plan, and the need for outpatient follow-up.  Although the patient has no emergent etiology today this does not preclude the development of an emergent condition so in addition, I have advised the patient that they can return to the ED and/or activate EMS at any time with worsening of their symptoms, change of their symptoms, or with any other medical complaint.  The patient remained comfortable and stable during their visit in the ED.  Discharge and follow-up instructions discussed with the patient who expressed understanding and willingness to comply with my recommendations. I discussed with the patient/family the diagnosis, treatment plan, indications for return to the emergency department, and for expected follow-up. Please follow up with your primary doctor in 1-2 days and return to the ED in any new, worsening, or continued symptoms. The patient/family verbalized an understanding. The patient/family is asked if there are any questions or concerns. We discuss the case, until all issues are addressed to the patient/family's satisfaction. Patient/family understands and is agreeable to the plan.    VIKTORIYA HORNER PA-C.    DISCLAIMER: This note was prepared with M87 voice recognition transcription software. Garbled syntax, mangled pronouns, and other bizarre constructions may be attributed to  that software system.      Amount and/or Complexity of Data Reviewed  Radiology: ordered. Decision-making details documented in ED Course.    Risk  Prescription drug management.               ED Course as of 06/14/25 0742   Sat Jun 14, 2025   0740 X-Ray Cervical Spine AP And Lateral  Moderate cervical spondylosis as above, similar to prior. [AF]      ED Course User Index  [AF] Michael Davis PA-C                           Clinical Impression:  Final diagnoses:  [M54.2] Neck pain  [M54.12] Cervical radiculopathy (Primary)          ED Disposition Condition    Discharge Stable          ED Prescriptions       Medication Sig Dispense Start Date End Date Auth. Provider    naproxen (NAPROSYN) 500 MG tablet Take 1 tablet (500 mg total) by mouth 2 (two) times daily. 30 tablet 6/14/2025 -- Michael Davis PA-C    methocarbamoL (ROBAXIN) 500 MG Tab Take 2 tablets (1,000 mg total) by mouth 3 (three) times daily. for 5 days 30 tablet 6/14/2025 6/19/2025 Michael Davis PA-C    LIDOcaine (LIDODERM) 5 % Place 1 patch onto the skin once daily. Apply patch for 12 hours and then leave off for 12 hours 15 patch 6/14/2025 -- Michael Davis PA-C          Follow-up Information       Follow up With Specialties Details Why Contact Info    Your primary care physician  Schedule an appointment as soon as possible for a visit in 1 day for follow up regarding today's visit and for re-evaluation. Please follow up in 1-2 days.     [facility] Emergency Dept Emergency Medicine Go to  If you have new or worsening symptoms, or if you have any concerns at all. [address] [facility] [place] [address] [phone]    [facility] -  Schedule an appointment as soon as possible for a visit in 1 day for follow up if you do not currently have a  [address] [place] [id number] [phone]                     [1]   Social History  Tobacco Use    Smoking status: Some Days      Current packs/day: 0.50     Types: Cigars, Cigarettes    Smokeless tobacco: Never    Tobacco comments:     black & mild   Substance Use Topics    Alcohol use: Yes     Comment: weekends    Drug use: Yes     Types: Marijuana        Michael Davis PA-C  06/14/25 0742

## 2025-06-14 NOTE — DISCHARGE INSTRUCTIONS
Thank you for coming to our Emergency Department today. It is important to remember that some problems or medical conditions are difficult to diagnose and may not be found or addressed during your Emergency Department visit.  These conditions often start with non-specific symptoms and can only be diagnosed on follow up visits with your primary care physician or specialist when the symptoms continue or change. Please remember that all medical conditions can change, and we cannot predict how you will be feeling tomorrow or the next day. Return to the ER with any questions/concerns, new/concerning symptoms including fever, chest pain, shortness of breath, loss of consciousness, dizziness, weakness, worsening symptoms, failure to improve, or any other concerns. Also, please follow up with your Primary Care Physician and/or Pediatrician in the next 1-2 days to review your ED visit in entirety and for re-evaluation.   Be sure to follow up with your primary care doctor and review all labs/imaging/tests that were performed during your ER visit with them. It is very common for us to identify non-emergent incidental findings which must be followed up with your primary care physician.  Some labs/imaging/tests may be outside of the normal range, and require non-emergent follow-up and/or further investigation/treatment/procedures/testing to help diagnose/exclude/prevent complications or other potentially serious medical conditions. Some abnormalities may not have been discussed or addressed during your ER visit. Some lab results may not return during your ER visit but can be accessible by downloading the free [facility] or by visiting [email or web address] . It is important for you to review all labs/imaging/tests which are outside of the normal range with your physician.  An ER visit does not replace a primary care visit, and many screening tests or follow-up tests cannot be ordered by an ER doctor or performed by  the ER. Some tests may even require pre-approval.  If you do not have a primary care doctor, you may contact the one listed on your discharge paperwork or you may also call the [facility] Appointment Desk at [phone] , or [address] [place] [address] at  [phone] to schedule an appointment, or establish care with a primary care doctor or even a specialist and to obtain information about local resources. It is important to your health that you have a primary care doctor.  Please take all medications as directed. We have done our best to select a medication for you that will treat your condition however, all medications may potentially have side-effects and it is impossible to predict which medications may give you side-effects or what those side-effects (if any) those medications may give you.  If you feel that you are having a negative effect or side-effect of any medication you should stop taking those medications immediately and seek medical attention. If you feel that you are having a life-threatening reaction call 911.  Do not drive, swim, climb to height, take a bath, operate heavy machinery, drink alcohol or take potentially sedating medications, sign any legal documents or make any important decisions for 24 hours if you have received any pain medications, sedatives or mood altering drugs during your ER visit or within 24 hours of taking them if they have been prescribed to you.   You can find additional resources for Dentists, hearing aids, durable medical equipment, low cost pharmacies and other resources at [email or web address]  Patient agrees with this plan. Discussed with her strict return precautions, they verbalized understanding. Patient is stable for discharge.   § Please take all medication as prescribed.

## 2025-06-14 NOTE — ED NOTES
Pt is complaining of neck pain that radiates on the rt side down his shoulder and into arm X 2 weeks, denies injury but states he may have pulled something while he was working in his garden.    LOC: Pt is awake alert and aware of environment, oriented X3 and speaking appropriately  Appearance: Pt is in no acute distress, Pt is well groomed and clean  Skin: skin is warm and dry with normal turgor, mucus membranes are moist and pink, skin is intact with no bruising or breakdown  Muskuloskeletal: Pt moves all extremities well, there is no obvious swelling or deformities noted, pulses are intact.  Respiratory: Airway is open and patent, respirations are spontaneous and even.  Cardiac:  no edema and cap refill is <3sec  Neuro: Pt follows commands easily and has no obvious deficits